# Patient Record
Sex: FEMALE | Race: WHITE | NOT HISPANIC OR LATINO
[De-identification: names, ages, dates, MRNs, and addresses within clinical notes are randomized per-mention and may not be internally consistent; named-entity substitution may affect disease eponyms.]

---

## 2020-11-10 ENCOUNTER — NON-APPOINTMENT (OUTPATIENT)
Age: 60
End: 2020-11-10

## 2020-11-10 DIAGNOSIS — Z82.5 FAMILY HISTORY OF ASTHMA AND OTHER CHRONIC LOWER RESPIRATORY DISEASES: ICD-10-CM

## 2020-11-10 DIAGNOSIS — Z87.09 PERSONAL HISTORY OF OTHER DISEASES OF THE RESPIRATORY SYSTEM: ICD-10-CM

## 2020-11-10 DIAGNOSIS — Z80.1 FAMILY HISTORY OF MALIGNANT NEOPLASM OF TRACHEA, BRONCHUS AND LUNG: ICD-10-CM

## 2020-11-10 DIAGNOSIS — Z87.891 PERSONAL HISTORY OF NICOTINE DEPENDENCE: ICD-10-CM

## 2020-11-10 PROBLEM — Z00.00 ENCOUNTER FOR PREVENTIVE HEALTH EXAMINATION: Status: ACTIVE | Noted: 2020-11-10

## 2020-11-10 RX ORDER — IBUPROFEN 200 MG/1
200 TABLET, COATED ORAL
Refills: 0 | Status: ACTIVE | COMMUNITY

## 2020-11-10 RX ORDER — LORATADINE 10 MG
5-120 TABLET ORAL
Refills: 0 | Status: ACTIVE | COMMUNITY

## 2020-11-10 RX ORDER — ALBUTEROL 90 MCG
90 AEROSOL (GRAM) INHALATION
Refills: 0 | Status: ACTIVE | COMMUNITY

## 2020-11-10 RX ORDER — FLUTICASONE PROPIONATE 50 MCG
50 SPRAY, SUSPENSION NASAL DAILY
Refills: 0 | Status: ACTIVE | COMMUNITY

## 2020-11-19 ENCOUNTER — APPOINTMENT (OUTPATIENT)
Dept: PULMONOLOGY | Facility: CLINIC | Age: 60
End: 2020-11-19
Payer: COMMERCIAL

## 2020-11-19 VITALS
OXYGEN SATURATION: 99 % | DIASTOLIC BLOOD PRESSURE: 80 MMHG | SYSTOLIC BLOOD PRESSURE: 140 MMHG | TEMPERATURE: 97.2 F | HEART RATE: 88 BPM

## 2020-11-19 PROCEDURE — 99214 OFFICE O/P EST MOD 30 MIN: CPT

## 2020-11-19 RX ORDER — BUDESONIDE AND FORMOTEROL FUMARATE DIHYDRATE 80; 4.5 UG/1; UG/1
80-4.5 AEROSOL RESPIRATORY (INHALATION) TWICE DAILY
Qty: 3 | Refills: 3 | Status: ACTIVE | COMMUNITY
Start: 2020-11-19

## 2020-11-19 RX ORDER — MOMETASONE FUROATE AND FORMOTEROL FUMARATE DIHYDRATE 200; 5 UG/1; UG/1
200-5 AEROSOL RESPIRATORY (INHALATION) TWICE DAILY
Refills: 0 | Status: DISCONTINUED | COMMUNITY
End: 2020-11-19

## 2020-12-15 ENCOUNTER — APPOINTMENT (OUTPATIENT)
Dept: PULMONOLOGY | Facility: CLINIC | Age: 60
End: 2020-12-15
Payer: COMMERCIAL

## 2020-12-15 VITALS
OXYGEN SATURATION: 96 % | SYSTOLIC BLOOD PRESSURE: 130 MMHG | DIASTOLIC BLOOD PRESSURE: 90 MMHG | HEART RATE: 99 BPM | TEMPERATURE: 97.3 F

## 2020-12-15 PROCEDURE — 99214 OFFICE O/P EST MOD 30 MIN: CPT

## 2020-12-15 PROCEDURE — 99072 ADDL SUPL MATRL&STAF TM PHE: CPT

## 2020-12-15 RX ORDER — IPRATROPIUM BROMIDE AND ALBUTEROL SULFATE .5; 3 MG/3ML; MG/3ML
SOLUTION RESPIRATORY (INHALATION)
Refills: 0 | Status: ACTIVE | COMMUNITY

## 2020-12-15 RX ORDER — IPRATROPIUM BROMIDE AND ALBUTEROL SULFATE 2.5; .5 MG/3ML; MG/3ML
0.5-2.5 (3) SOLUTION RESPIRATORY (INHALATION)
Qty: 360 | Refills: 3 | Status: ACTIVE | COMMUNITY
Start: 2020-12-15 | End: 1900-01-01

## 2020-12-15 NOTE — PHYSICAL EXAM
[No Acute Distress] : no acute distress [Normal Oropharynx] : normal oropharynx [Normal Appearance] : normal appearance [No Neck Mass] : no neck mass [Normal Rate/Rhythm] : normal rate/rhythm [Normal S1, S2] : normal s1, s2 [No Murmurs] : no murmurs [No Resp Distress] : no resp distress [No Abnormalities] : no abnormalities [Benign] : benign [Normal Gait] : normal gait [No Clubbing] : no clubbing [No Cyanosis] : no cyanosis [No Edema] : no edema [FROM] : FROM [Normal Color/ Pigmentation] : normal color/ pigmentation [No Focal Deficits] : no focal deficits [Oriented x3] : oriented x3 [Normal Affect] : normal affect [TextBox_11] : +pian over  ant sinus region [TextBox_68] : slight coarse rhonchi bilaterally

## 2020-12-15 NOTE — HISTORY OF PRESENT ILLNESS
[Former] : former [Never] : never [TextBox_4] : recent visit and doing well\par one week later lost taste and smell \par no sob no cough\par went to ent and told sinus infection\par has been coughing for 1 week  and wheeze for 4 days \par called ent and has been on abx and no improvement \par pt has these symptoms every few months for past 2 yrs\par no fever sl chest discomfort \par worse with lying down\par no decongestant given [TextBox_11] : 1 [YearQuit] : 2011

## 2020-12-15 NOTE — REASON FOR VISIT
[Follow-Up] : a follow-up visit [Asthma] : asthma [COPD] : COPD [TextBox_44] : PT IS A 59 YO FEMALE WITH HX OF ASTHMA AND COPD.  PT STATES THAT SHE IS CURRENTLY BEING TREATED FOR A SINUS INFECTION AND IS IS CAUSING A WORSENING COUGH AND WHEEZE DUE TO POST NASAL DRIP.  PT ALSO ADMITS TO SOB, BUT DENIES ANY FEVER. PT STATES THAT SHE DOES HAVE DOME PAIN AND TIGHTNESS WITH BREATHING.

## 2020-12-15 NOTE — DISCUSSION/SUMMARY
[FreeTextEntry1] : pt with acute sinusitis and exac asthma\par will dc clindamycin\par start augmentin 875 1 bid #20\par start claritin d 1 qd\par start medrol dose rhiannon\par cont duoneb q4-6 h prn

## 2020-12-29 ENCOUNTER — APPOINTMENT (OUTPATIENT)
Dept: PULMONOLOGY | Facility: CLINIC | Age: 60
End: 2020-12-29
Payer: COMMERCIAL

## 2020-12-29 VITALS
SYSTOLIC BLOOD PRESSURE: 128 MMHG | HEART RATE: 96 BPM | DIASTOLIC BLOOD PRESSURE: 90 MMHG | OXYGEN SATURATION: 98 % | TEMPERATURE: 97.7 F

## 2020-12-29 DIAGNOSIS — J45.909 UNSPECIFIED ASTHMA, UNCOMPLICATED: ICD-10-CM

## 2020-12-29 DIAGNOSIS — J44.9 CHRONIC OBSTRUCTIVE PULMONARY DISEASE, UNSPECIFIED: ICD-10-CM

## 2020-12-29 PROCEDURE — 99072 ADDL SUPL MATRL&STAF TM PHE: CPT

## 2020-12-29 PROCEDURE — 99214 OFFICE O/P EST MOD 30 MIN: CPT

## 2020-12-29 NOTE — HISTORY OF PRESENT ILLNESS
[Former] : former [Never] : never [TextBox_4] : today feels good minimal shortness of breath claudette when going outside claudette cold air\par sinus congestion improved  taste and smell intermittent\par for ent eval next week\par no nite awakening\par no neb use in 10 d ago\par using symbicort bid and singulair daily\par minimal use albuterol [TextBox_11] : 1 [YearQuit] : 2010

## 2020-12-29 NOTE — DISCUSSION/SUMMARY
[FreeTextEntry1] : Pt doing very well\par acute sinus issue improved as did bronchitis\par will cont symbicort 80 bid and singulair 1 qd with albuterol prn\par

## 2020-12-29 NOTE — REASON FOR VISIT
[Follow-Up] : a follow-up visit [COPD] : COPD [TextBox_44] : PT IS A 59 YO FEMALE WITH HX OF COPD. PT STATES THAT HER BREATHING HAS BEEN BETTER - HAS NOT USED NEBULIZER IN WEEK AND A HALF. PT STATES THAT SHE DOES HAVE A LITTLE SOB, BUT DENIES ANY COUGH OR WHEEZE. PT ALSO DENIES ANY FEVER, CHILLS, CHEST PAIN OR TIGHTNESS WHEN BREATHING.

## 2021-05-17 ENCOUNTER — APPOINTMENT (OUTPATIENT)
Dept: PULMONOLOGY | Facility: CLINIC | Age: 61
End: 2021-05-17
Payer: COMMERCIAL

## 2021-05-17 VITALS
OXYGEN SATURATION: 98 % | TEMPERATURE: 96.7 F | SYSTOLIC BLOOD PRESSURE: 126 MMHG | HEART RATE: 77 BPM | DIASTOLIC BLOOD PRESSURE: 76 MMHG

## 2021-05-17 DIAGNOSIS — J20.9 ACUTE BRONCHITIS, UNSPECIFIED: ICD-10-CM

## 2021-05-17 DIAGNOSIS — J45.41 ACUTE BRONCHITIS, UNSPECIFIED: ICD-10-CM

## 2021-05-17 DIAGNOSIS — J01.90 ACUTE SINUSITIS, UNSPECIFIED: ICD-10-CM

## 2021-05-17 PROCEDURE — 99072 ADDL SUPL MATRL&STAF TM PHE: CPT

## 2021-05-17 PROCEDURE — 99214 OFFICE O/P EST MOD 30 MIN: CPT

## 2021-05-17 RX ORDER — DUPILUMAB 300 MG/2ML
300 INJECTION, SOLUTION SUBCUTANEOUS
Qty: 4 | Refills: 0 | Status: ACTIVE | COMMUNITY
Start: 2021-02-18

## 2021-05-17 RX ORDER — FLUOCINONIDE 0.5 MG/ML
0.05 SOLUTION TOPICAL
Qty: 60 | Refills: 0 | Status: ACTIVE | COMMUNITY
Start: 2021-02-04

## 2021-05-17 RX ORDER — PREDNISONE 10 MG/1
10 TABLET ORAL
Qty: 30 | Refills: 0 | Status: ACTIVE | COMMUNITY
Start: 2021-02-16

## 2021-05-17 RX ORDER — METHYLPREDNISOLONE 4 MG/1
4 TABLET ORAL
Qty: 1 | Refills: 1 | Status: DISCONTINUED | COMMUNITY
Start: 2020-12-15 | End: 2021-05-17

## 2021-05-17 RX ORDER — BUDESONIDE AND FORMOTEROL FUMARATE DIHYDRATE 80; 4.5 UG/1; UG/1
80-4.5 AEROSOL RESPIRATORY (INHALATION) TWICE DAILY
Qty: 3 | Refills: 6 | Status: DISCONTINUED | COMMUNITY
Start: 2020-12-15 | End: 2021-05-17

## 2021-05-17 RX ORDER — CLINDAMYCIN HYDROCHLORIDE 300 MG/1
300 CAPSULE ORAL
Refills: 0 | Status: DISCONTINUED | COMMUNITY
End: 2021-05-17

## 2021-05-17 RX ORDER — CLOTRIMAZOLE AND BETAMETHASONE DIPROPIONATE 10; .5 MG/G; MG/G
1-0.05 CREAM TOPICAL
Qty: 15 | Refills: 0 | Status: ACTIVE | COMMUNITY
Start: 2021-05-07

## 2021-05-17 RX ORDER — AMOXICILLIN AND CLAVULANATE POTASSIUM 875; 125 MG/1; MG/1
875-125 TABLET, COATED ORAL
Qty: 20 | Refills: 1 | Status: DISCONTINUED | COMMUNITY
Start: 2020-12-15 | End: 2021-05-17

## 2021-05-17 NOTE — HISTORY OF PRESENT ILLNESS
[Former] : former [Never] : never [TextBox_4] : 60 female wth ch sinusitis and asthma\par Matthew told pt needs surgery  and referred ent surgery  and started on dupixent and seen by allergist and started on injections and feels great\par ++smell and taste\par no sob no cough o wheeze no cp no tightness\par full activity  no awakening  [TextBox_11] : .5 [TextBox_13] : 30 [YearQuit] : 2009

## 2021-05-17 NOTE — DISCUSSION/SUMMARY
[FreeTextEntry1] : overall doing very well\par seems   to be major improvement with aggressive treatment of sinus infection\par will cont all medication\par will try holding symbicort and see if any change in symptoms\par will cont montelukast at this time\par The patient understands and agrees with plan of care.\par Today's office visit encompassed 32 minutes. I conducted an extensive history ,physical exam and reviewed diagnosis and treatment options  including diagnostic tests,radiologic studies including  cat scans  and the use of prescription medication.

## 2021-06-11 ENCOUNTER — RX RENEWAL (OUTPATIENT)
Age: 61
End: 2021-06-11

## 2021-06-11 RX ORDER — ALBUTEROL SULFATE 90 UG/1
108 (90 BASE) AEROSOL, METERED RESPIRATORY (INHALATION)
Qty: 2 | Refills: 11 | Status: ACTIVE | COMMUNITY
Start: 2021-06-11 | End: 1900-01-01

## 2021-06-14 RX ORDER — ALBUTEROL SULFATE 90 UG/1
108 (90 BASE) INHALANT RESPIRATORY (INHALATION)
Qty: 1 | Refills: 0 | Status: ACTIVE | COMMUNITY
Start: 2021-06-14 | End: 1900-01-01

## 2021-07-15 RX ORDER — MONTELUKAST 10 MG/1
10 TABLET, FILM COATED ORAL DAILY
Qty: 90 | Refills: 3 | Status: ACTIVE | COMMUNITY
Start: 1900-01-01 | End: 1900-01-01

## 2025-04-01 ENCOUNTER — APPOINTMENT (OUTPATIENT)
Dept: COLORECTAL SURGERY | Facility: CLINIC | Age: 65
End: 2025-04-01
Payer: COMMERCIAL

## 2025-04-01 VITALS
WEIGHT: 181 LBS | HEIGHT: 60 IN | SYSTOLIC BLOOD PRESSURE: 125 MMHG | BODY MASS INDEX: 35.53 KG/M2 | DIASTOLIC BLOOD PRESSURE: 83 MMHG | TEMPERATURE: 97.3 F | HEART RATE: 85 BPM

## 2025-04-01 DIAGNOSIS — C20 MALIGNANT NEOPLASM OF RECTUM: ICD-10-CM

## 2025-04-01 PROCEDURE — 99205 OFFICE O/P NEW HI 60 MIN: CPT | Mod: 25

## 2025-04-01 PROCEDURE — 45300 PROCTOSIGMOIDOSCOPY DX: CPT

## 2025-04-08 ENCOUNTER — NON-APPOINTMENT (OUTPATIENT)
Age: 65
End: 2025-04-08

## 2025-04-08 ENCOUNTER — OUTPATIENT (OUTPATIENT)
Dept: OUTPATIENT SERVICES | Facility: HOSPITAL | Age: 65
LOS: 1 days | End: 2025-04-08
Payer: COMMERCIAL

## 2025-04-08 ENCOUNTER — RESULT REVIEW (OUTPATIENT)
Age: 65
End: 2025-04-08

## 2025-04-08 DIAGNOSIS — C20 MALIGNANT NEOPLASM OF RECTUM: ICD-10-CM

## 2025-04-08 PROCEDURE — 88321 CONSLTJ&REPRT SLD PREP ELSWR: CPT

## 2025-04-09 DIAGNOSIS — C20 MALIGNANT NEOPLASM OF RECTUM: ICD-10-CM

## 2025-04-11 ENCOUNTER — NON-APPOINTMENT (OUTPATIENT)
Age: 65
End: 2025-04-11

## 2025-04-11 LAB — SURGICAL PATHOLOGY STUDY: SIGNIFICANT CHANGE UP

## 2025-04-14 ENCOUNTER — APPOINTMENT (OUTPATIENT)
Dept: CT IMAGING | Facility: CLINIC | Age: 65
End: 2025-04-14

## 2025-04-14 ENCOUNTER — APPOINTMENT (OUTPATIENT)
Dept: MRI IMAGING | Facility: CLINIC | Age: 65
End: 2025-04-14
Payer: COMMERCIAL

## 2025-04-14 ENCOUNTER — RESULT REVIEW (OUTPATIENT)
Age: 65
End: 2025-04-14

## 2025-04-14 ENCOUNTER — TRANSCRIPTION ENCOUNTER (OUTPATIENT)
Age: 65
End: 2025-04-14

## 2025-04-14 PROCEDURE — 74177 CT ABD & PELVIS W/CONTRAST: CPT

## 2025-04-14 PROCEDURE — 72197 MRI PELVIS W/O & W/DYE: CPT

## 2025-04-14 PROCEDURE — A9585: CPT | Mod: JW

## 2025-04-14 PROCEDURE — 71260 CT THORAX DX C+: CPT

## 2025-04-18 ENCOUNTER — APPOINTMENT (OUTPATIENT)
Dept: COLORECTAL SURGERY | Facility: CLINIC | Age: 65
End: 2025-04-18

## 2025-04-25 ENCOUNTER — NON-APPOINTMENT (OUTPATIENT)
Age: 65
End: 2025-04-25

## 2025-04-25 ENCOUNTER — APPOINTMENT (OUTPATIENT)
Dept: COLORECTAL SURGERY | Facility: CLINIC | Age: 65
End: 2025-04-25
Payer: COMMERCIAL

## 2025-04-25 DIAGNOSIS — C20 MALIGNANT NEOPLASM OF RECTUM: ICD-10-CM

## 2025-04-25 PROCEDURE — 99214 OFFICE O/P EST MOD 30 MIN: CPT | Mod: 93

## 2025-05-06 ENCOUNTER — APPOINTMENT (OUTPATIENT)
Dept: COLORECTAL SURGERY | Facility: CLINIC | Age: 65
End: 2025-05-06
Payer: COMMERCIAL

## 2025-05-06 ENCOUNTER — NON-APPOINTMENT (OUTPATIENT)
Age: 65
End: 2025-05-06

## 2025-05-06 VITALS
DIASTOLIC BLOOD PRESSURE: 84 MMHG | HEART RATE: 78 BPM | SYSTOLIC BLOOD PRESSURE: 127 MMHG | TEMPERATURE: 78 F | WEIGHT: 181 LBS | BODY MASS INDEX: 35.53 KG/M2 | HEIGHT: 60 IN

## 2025-05-06 DIAGNOSIS — C20 MALIGNANT NEOPLASM OF RECTUM: ICD-10-CM

## 2025-05-06 DIAGNOSIS — J45.909 UNSPECIFIED ASTHMA, UNCOMPLICATED: ICD-10-CM

## 2025-05-06 DIAGNOSIS — J44.9 CHRONIC OBSTRUCTIVE PULMONARY DISEASE, UNSPECIFIED: ICD-10-CM

## 2025-05-06 PROCEDURE — 99215 OFFICE O/P EST HI 40 MIN: CPT

## 2025-05-20 ENCOUNTER — OUTPATIENT (OUTPATIENT)
Dept: OUTPATIENT SERVICES | Facility: HOSPITAL | Age: 65
LOS: 1 days | End: 2025-05-20
Payer: COMMERCIAL

## 2025-05-20 VITALS
RESPIRATION RATE: 16 BRPM | WEIGHT: 182.1 LBS | TEMPERATURE: 98 F | OXYGEN SATURATION: 99 % | DIASTOLIC BLOOD PRESSURE: 64 MMHG | HEIGHT: 60 IN | HEART RATE: 83 BPM | SYSTOLIC BLOOD PRESSURE: 126 MMHG

## 2025-05-20 DIAGNOSIS — C20 MALIGNANT NEOPLASM OF RECTUM: ICD-10-CM

## 2025-05-20 DIAGNOSIS — Z98.891 HISTORY OF UTERINE SCAR FROM PREVIOUS SURGERY: Chronic | ICD-10-CM

## 2025-05-20 DIAGNOSIS — Z98.890 OTHER SPECIFIED POSTPROCEDURAL STATES: Chronic | ICD-10-CM

## 2025-05-20 DIAGNOSIS — Z90.49 ACQUIRED ABSENCE OF OTHER SPECIFIED PARTS OF DIGESTIVE TRACT: Chronic | ICD-10-CM

## 2025-05-20 DIAGNOSIS — Z01.818 ENCOUNTER FOR OTHER PREPROCEDURAL EXAMINATION: ICD-10-CM

## 2025-05-20 LAB
ALBUMIN SERPL ELPH-MCNC: 3.3 G/DL — SIGNIFICANT CHANGE UP (ref 3.3–5)
ALP SERPL-CCNC: 78 U/L — SIGNIFICANT CHANGE UP (ref 40–120)
ALT FLD-CCNC: 25 U/L — SIGNIFICANT CHANGE UP (ref 12–78)
ANION GAP SERPL CALC-SCNC: 3 MMOL/L — LOW (ref 5–17)
APTT BLD: 27.5 SEC — SIGNIFICANT CHANGE UP (ref 26.1–36.8)
AST SERPL-CCNC: 18 U/L — SIGNIFICANT CHANGE UP (ref 15–37)
BASOPHILS # BLD AUTO: 0.08 K/UL — SIGNIFICANT CHANGE UP (ref 0–0.2)
BASOPHILS NFR BLD AUTO: 0.7 % — SIGNIFICANT CHANGE UP (ref 0–2)
BILIRUB DIRECT SERPL-MCNC: <0.1 MG/DL — SIGNIFICANT CHANGE UP (ref 0–0.3)
BILIRUB INDIRECT FLD-MCNC: >0.3 MG/DL — SIGNIFICANT CHANGE UP (ref 0.2–1)
BILIRUB SERPL-MCNC: 0.4 MG/DL — SIGNIFICANT CHANGE UP (ref 0.2–1.2)
BILIRUB SERPL-MCNC: 0.4 MG/DL — SIGNIFICANT CHANGE UP (ref 0.2–1.2)
BUN SERPL-MCNC: 18 MG/DL — SIGNIFICANT CHANGE UP (ref 7–23)
CALCIUM SERPL-MCNC: 9.3 MG/DL — SIGNIFICANT CHANGE UP (ref 8.5–10.1)
CEA SERPL-MCNC: 3 NG/ML — SIGNIFICANT CHANGE UP (ref 0–3.8)
CHLORIDE SERPL-SCNC: 110 MMOL/L — HIGH (ref 96–108)
CO2 SERPL-SCNC: 26 MMOL/L — SIGNIFICANT CHANGE UP (ref 22–31)
CREAT SERPL-MCNC: 0.62 MG/DL — SIGNIFICANT CHANGE UP (ref 0.5–1.3)
EGFR: 99 ML/MIN/1.73M2 — SIGNIFICANT CHANGE UP
EGFR: 99 ML/MIN/1.73M2 — SIGNIFICANT CHANGE UP
EOSINOPHIL # BLD AUTO: 0.43 K/UL — SIGNIFICANT CHANGE UP (ref 0–0.5)
EOSINOPHIL NFR BLD AUTO: 3.8 % — SIGNIFICANT CHANGE UP (ref 0–6)
GLUCOSE SERPL-MCNC: 115 MG/DL — HIGH (ref 70–99)
HCT VFR BLD CALC: 36 % — SIGNIFICANT CHANGE UP (ref 34.5–45)
HGB BLD-MCNC: 12 G/DL — SIGNIFICANT CHANGE UP (ref 11.5–15.5)
IMM GRANULOCYTES # BLD AUTO: 0.04 K/UL — SIGNIFICANT CHANGE UP (ref 0–0.07)
IMM GRANULOCYTES NFR BLD AUTO: 0.4 % — SIGNIFICANT CHANGE UP (ref 0–0.9)
INR BLD: 0.93 RATIO — SIGNIFICANT CHANGE UP (ref 0.85–1.16)
LYMPHOCYTES # BLD AUTO: 2.58 K/UL — SIGNIFICANT CHANGE UP (ref 1–3.3)
LYMPHOCYTES NFR BLD AUTO: 22.6 % — SIGNIFICANT CHANGE UP (ref 13–44)
MCHC RBC-ENTMCNC: 29.7 PG — SIGNIFICANT CHANGE UP (ref 27–34)
MCHC RBC-ENTMCNC: 33.3 G/DL — SIGNIFICANT CHANGE UP (ref 32–36)
MCV RBC AUTO: 89.1 FL — SIGNIFICANT CHANGE UP (ref 80–100)
MONOCYTES # BLD AUTO: 1.14 K/UL — HIGH (ref 0–0.9)
MONOCYTES NFR BLD AUTO: 10 % — SIGNIFICANT CHANGE UP (ref 2–14)
NEUTROPHILS # BLD AUTO: 7.15 K/UL — SIGNIFICANT CHANGE UP (ref 1.8–7.4)
NEUTROPHILS NFR BLD AUTO: 62.5 % — SIGNIFICANT CHANGE UP (ref 43–77)
NRBC # BLD AUTO: 0 K/UL — SIGNIFICANT CHANGE UP (ref 0–0)
NRBC # FLD: 0 K/UL — SIGNIFICANT CHANGE UP (ref 0–0)
NRBC BLD AUTO-RTO: 0 /100 WBCS — SIGNIFICANT CHANGE UP (ref 0–0)
PLATELET # BLD AUTO: 365 K/UL — SIGNIFICANT CHANGE UP (ref 150–400)
PMV BLD: 8.8 FL — SIGNIFICANT CHANGE UP (ref 7–13)
POTASSIUM SERPL-MCNC: 3.9 MMOL/L — SIGNIFICANT CHANGE UP (ref 3.5–5.3)
POTASSIUM SERPL-SCNC: 3.9 MMOL/L — SIGNIFICANT CHANGE UP (ref 3.5–5.3)
PROT SERPL-MCNC: 7.1 GM/DL — SIGNIFICANT CHANGE UP (ref 6–8.3)
PROTHROM AB SERPL-ACNC: 11 SEC — SIGNIFICANT CHANGE UP (ref 9.9–13.4)
RBC # BLD: 4.04 M/UL — SIGNIFICANT CHANGE UP (ref 3.8–5.2)
RBC # FLD: 13.6 % — SIGNIFICANT CHANGE UP (ref 10.3–14.5)
SODIUM SERPL-SCNC: 139 MMOL/L — SIGNIFICANT CHANGE UP (ref 135–145)
WBC # BLD: 11.42 K/UL — HIGH (ref 3.8–10.5)
WBC # FLD AUTO: 11.42 K/UL — HIGH (ref 3.8–10.5)

## 2025-05-20 PROCEDURE — 86901 BLOOD TYPING SEROLOGIC RH(D): CPT

## 2025-05-20 PROCEDURE — 86900 BLOOD TYPING SEROLOGIC ABO: CPT

## 2025-05-20 PROCEDURE — 82248 BILIRUBIN DIRECT: CPT

## 2025-05-20 PROCEDURE — 82247 BILIRUBIN TOTAL: CPT

## 2025-05-20 PROCEDURE — 80053 COMPREHEN METABOLIC PANEL: CPT

## 2025-05-20 PROCEDURE — 82378 CARCINOEMBRYONIC ANTIGEN: CPT

## 2025-05-20 PROCEDURE — 93005 ELECTROCARDIOGRAM TRACING: CPT

## 2025-05-20 PROCEDURE — 93010 ELECTROCARDIOGRAM REPORT: CPT

## 2025-05-20 PROCEDURE — 83036 HEMOGLOBIN GLYCOSYLATED A1C: CPT

## 2025-05-20 PROCEDURE — 85610 PROTHROMBIN TIME: CPT

## 2025-05-20 PROCEDURE — 36415 COLL VENOUS BLD VENIPUNCTURE: CPT

## 2025-05-20 PROCEDURE — 99214 OFFICE O/P EST MOD 30 MIN: CPT | Mod: 25

## 2025-05-20 PROCEDURE — 86850 RBC ANTIBODY SCREEN: CPT

## 2025-05-20 PROCEDURE — 85730 THROMBOPLASTIN TIME PARTIAL: CPT

## 2025-05-20 PROCEDURE — 85025 COMPLETE CBC W/AUTO DIFF WBC: CPT

## 2025-05-20 NOTE — H&P PST ADULT - RESPIRATORY AND THORAX COMMENTS
hx of asthma never intubated or hospitalized , last use of inhaler 2 years ago, on Dupixent last taken 5/18/25 patient state "WBC  typically drops after  receiving it  "

## 2025-05-20 NOTE — H&P PST ADULT - NSICDXPASTMEDICALHX_GEN_ALL_CORE_FT
PAST MEDICAL HISTORY:  Asthma     History of diverticulitis     Rectal cancer     Seasonal allergies

## 2025-05-20 NOTE — H&P PST ADULT - HISTORY OF PRESENT ILLNESS
65 y/o female presents to [facility] for scheduled laparoscopic possible open Ultra low anterior resection proctectomy with lymph node dissection proctosigmoidoscopy diverting loop ileostomy on 6/5/25. Patient reports initial colonoscopy done 3/2025 and dx with rectal cancer s/p  1 round of chemo completed 4/2025.

## 2025-05-20 NOTE — H&P PST ADULT - PROBLEM SELECTOR PLAN 1
Pre op and chlorhexidine instructions given and explained.  colon sheet provided and bowel prep advised as per surgeon   Avoid NSAIDs and OTC supplements.   Patient verbalized understanding  medical optimization requested by surgeon   cbc, bmp, type and screen, ptt/inr, a1c,  albumin, cea, lft  done today in PST

## 2025-05-20 NOTE — H&P PST ADULT - ASSESSMENT
65 y/o female presents to [facility] for scheduled laparoscopic possible open Ultra low anterior resection proctectomy with lymph node dissection proctosigmoidoscopy diverting loop ileostomy on 25    CAPRINI SCORE Version 2013    AGE RELATED RISK FACTORS                                                             [ ] Age 41-60 years             [1 point]  [ x] Age: 61-74 years            [2 points]                 [ ] Age 75 years or over     [3 points]             DISEASE RELATED RISK FACTORS                                                       [ ] Current swollen legs (pitting edema of any level)     [1 point]                     [ ] Varicose veins (visible, bulging vein, not spider veins or surgically removed veins)   [1 point]                                 [x ] BMI > 25 Kg/m2                       [1 point]  [ ] BMI > 40 kg/m2                       [1 point]                                 [ ] Serious infection (within past month, requiring hospitalization and IV antibiotics)    [1 point]                     [ ] Lung disease ( interstitial: COPD, emphysema, sarcoid, 9-11 illness, NOT asthma)       [1 point]                                                                          [ ] Acute myocardial infarction (within past month)      [1 point]                  [ ] Congestive heart failure (episode within past month or taking CHF meds)                   [1 point]         [ ] Inflammatory bowel disease (Crohns disease or ulcerative colitis, not irritable bowel syndrome)   [1 point]                  [ ] Central venous access, PICC line or Port (within past month)     [2 points]                                                             [ ] Stroke (within past month)     [5 points]    [ x] Previous or present malignancy (includes melanoma but not basal cell carcinoma, each incidence of cancer scores 2 points-not metastases)  [2 points]                                                                                                                                                         HEMATOLOGY RELATED FACTORS                                                         [ ] History of DVT or PE (including superficial venous thrombosis)    [3 points]                    [ ] Positive family history for DVT or PE (includes first-, second-, and third-degree relatives)   [3 points]   [ ] Personal or family history of genetic thrombophilia (family history counts only if it has not been confirmed that patient does not have this genetic marker)                       [ ] Prothrombin 66719H mutation                   [3 points]                           [ ] Factor V Leiden                                               [3 points]            [ ] Antithrombin III deficiency                           [3 points]         [ ] Protein C & S deficiency                                [3 points]              [ ] Dysfibrinogenemia                                         [3 points]  [ ] Personal history of acquired thrombophilia                       [ ] Lupus anticoagulant                                       [3 points]                                                                  [ ] Anticardiolipin antibodies                             [3 points]              [ ] Antiphospholipid antibodies                         [3 points]                                                         [ ] High homocysteine in the blood                  [3 points]                                                    [ ] Myeloproliferative disorders (including thrombocytosis)    [3 points]            [ ] HIV                                                                     [3 points]                                                    [ ] Heparin induced thrombocytopenia            [3 points]                                        MOBILITY RELATED FACTORS  [ ] Bed rest or restricted mobility (inability to ambulate 30 feet continuously or removable leg brace) for less than 72 hours    [1 point]  [ ] Nonremovable plaster cast or mold that prevents calf muscle use   [2 points]  [ ] Bed bound  or restricted mobility for more than 72 hours                  [2 points]    GENDER SPECIFIC FACTORS  [ ] Pregnancy or had a baby within the last month   [1 point]  [ ] Hormone therapy  or oral contraception               [1 point]  [ ] Current use of estrogen-like drugs (raloxifine, tamoxifen, anastrozole, letrozole)                                [1 point]  [ ] History of unexplained stillborn infant, premature birth with toxemia or growth-restricted infant   [1 point]  [ ] Recurrent spontaneous abortions (3 or more)      [1 point]    OTHER RISK FACTORS                                         [ ] Current smoker (includes vaping and smoking marijuana)      [1 point]  [ ] Diabetes requiring insulin     [1 point]                   [ ] Chemotherapy (includes methotrexate for rheumatoid arthritis, hydroxyurea for thrombocytosis)    [1 point]  [ ] Blood transfusion(s)               [1 point]    SURGERY RELATED RISK FACTORS  [ ]  section within the last month                    [1 point]  [ ] Minor surgery is planned (less than 45 minutes)     [1 point]  [ ] Past major surgery (longer than 45 minutes) within past month  [2 points]  [x ] Planned major surgery lasting more than 45 minutes (includes laparoscopic and arthroscopic; do not add to the "5" for hip and knee replacement)    [2 points]  [x ] Length of surgery over 2 hours (includes anesthesia time; do not add to the "5" for hip and knee replacement)   [1 point]  [ ] Elective hip or knee joint replacement surgery         [5 points]                                               TRAUMA RELATED RISK FACTORS  [ ] Fracture of the hip, pelvis, or leg                       [5 points]  [ ] Spinal cord injury resulting in paralysis (within the past month)    [5 points]  [ ] Paralysis  (within the past month)                      [5 points]  [ ] Multiple trauma (within the past month)         [5 Points]    Total Score [ 8       ]      Caprini score 7 or higher: High risk, pharmocologic VTE prophylaxis indicated for most patients (in the absence of contraindications)

## 2025-05-20 NOTE — H&P PST ADULT - NSICDXFAMILYHX_GEN_ALL_CORE_FT
FAMILY HISTORY:  Mother  Still living? Unknown  FH: esophageal cancer, Age at diagnosis: Age Unknown    Sibling  Still living? Unknown  FHx: breast cancer, Age at diagnosis: Age Unknown

## 2025-05-20 NOTE — H&P PST ADULT - NSICDXPASTSURGICALHX_GEN_ALL_CORE_FT
PAST SURGICAL HISTORY:  H/O colonoscopy     H/O shoulder surgery     H/O sinus surgery     History of infusaport central venous catheter insertion     S/P      S/P cholecystectomy

## 2025-05-21 DIAGNOSIS — Z01.818 ENCOUNTER FOR OTHER PREPROCEDURAL EXAMINATION: ICD-10-CM

## 2025-05-21 LAB
A1C WITH ESTIMATED AVERAGE GLUCOSE RESULT: 5.6 % — SIGNIFICANT CHANGE UP (ref 4–5.6)
ESTIMATED AVERAGE GLUCOSE: 114 MG/DL — SIGNIFICANT CHANGE UP (ref 68–114)

## 2025-05-21 RX ORDER — NEOMYCIN SULFATE 500 MG/1
500 TABLET ORAL
Qty: 6 | Refills: 0 | Status: ACTIVE | COMMUNITY
Start: 2025-05-21 | End: 1900-01-01

## 2025-05-21 RX ORDER — METRONIDAZOLE 500 MG/1
500 TABLET ORAL
Qty: 6 | Refills: 0 | Status: ACTIVE | COMMUNITY
Start: 2025-05-21 | End: 1900-01-01

## 2025-06-05 ENCOUNTER — APPOINTMENT (OUTPATIENT)
Dept: COLORECTAL SURGERY | Facility: HOSPITAL | Age: 65
End: 2025-06-05

## 2025-06-05 ENCOUNTER — RESULT REVIEW (OUTPATIENT)
Age: 65
End: 2025-06-05

## 2025-06-05 ENCOUNTER — INPATIENT (INPATIENT)
Facility: HOSPITAL | Age: 65
LOS: 3 days | Discharge: HOME HEALTH | DRG: 376 | End: 2025-06-09
Attending: COLON & RECTAL SURGERY | Admitting: COLON & RECTAL SURGERY
Payer: COMMERCIAL

## 2025-06-05 VITALS — WEIGHT: 182.1 LBS

## 2025-06-05 DIAGNOSIS — C20 MALIGNANT NEOPLASM OF RECTUM: ICD-10-CM

## 2025-06-05 DIAGNOSIS — Z90.49 ACQUIRED ABSENCE OF OTHER SPECIFIED PARTS OF DIGESTIVE TRACT: Chronic | ICD-10-CM

## 2025-06-05 DIAGNOSIS — Z98.890 OTHER SPECIFIED POSTPROCEDURAL STATES: Chronic | ICD-10-CM

## 2025-06-05 DIAGNOSIS — Z98.891 HISTORY OF UTERINE SCAR FROM PREVIOUS SURGERY: Chronic | ICD-10-CM

## 2025-06-05 LAB
ABO RH CONFIRMATION: SIGNIFICANT CHANGE UP
GLUCOSE BLDC GLUCOMTR-MCNC: 118 MG/DL — HIGH (ref 70–99)
GLUCOSE BLDC GLUCOMTR-MCNC: 159 MG/DL — HIGH (ref 70–99)

## 2025-06-05 PROCEDURE — C1889: CPT

## 2025-06-05 PROCEDURE — 88309 TISSUE EXAM BY PATHOLOGIST: CPT | Mod: 26

## 2025-06-05 PROCEDURE — C1765: CPT

## 2025-06-05 PROCEDURE — 88305 TISSUE EXAM BY PATHOLOGIST: CPT | Mod: 26

## 2025-06-05 PROCEDURE — 80048 BASIC METABOLIC PNL TOTAL CA: CPT

## 2025-06-05 PROCEDURE — 88309 TISSUE EXAM BY PATHOLOGIST: CPT

## 2025-06-05 PROCEDURE — 38570 LAPAROSCOPY LYMPH NODE BIOP: CPT

## 2025-06-05 PROCEDURE — 88300 SURGICAL PATH GROSS: CPT

## 2025-06-05 PROCEDURE — 45300 PROCTOSIGMOIDOSCOPY DX: CPT

## 2025-06-05 PROCEDURE — 45397 LAP REMOVE RECTUM W/POUCH: CPT

## 2025-06-05 PROCEDURE — 36415 COLL VENOUS BLD VENIPUNCTURE: CPT

## 2025-06-05 PROCEDURE — 82962 GLUCOSE BLOOD TEST: CPT

## 2025-06-05 PROCEDURE — 88300 SURGICAL PATH GROSS: CPT | Mod: 26,59

## 2025-06-05 PROCEDURE — 85027 COMPLETE CBC AUTOMATED: CPT

## 2025-06-05 PROCEDURE — 93005 ELECTROCARDIOGRAM TRACING: CPT

## 2025-06-05 PROCEDURE — 99222 1ST HOSP IP/OBS MODERATE 55: CPT

## 2025-06-05 PROCEDURE — 83735 ASSAY OF MAGNESIUM: CPT

## 2025-06-05 PROCEDURE — 88305 TISSUE EXAM BY PATHOLOGIST: CPT

## 2025-06-05 PROCEDURE — 38570 LAPAROSCOPY LYMPH NODE BIOP: CPT | Mod: AS

## 2025-06-05 PROCEDURE — 45397 LAP REMOVE RECTUM W/POUCH: CPT | Mod: AS

## 2025-06-05 PROCEDURE — C9399: CPT

## 2025-06-05 PROCEDURE — 84100 ASSAY OF PHOSPHORUS: CPT

## 2025-06-05 RX ORDER — CEFOTETAN DISODIUM 1 G
2 VIAL (EA) INJECTION ONCE
Refills: 0 | Status: COMPLETED | OUTPATIENT
Start: 2025-06-05 | End: 2025-06-05

## 2025-06-05 RX ORDER — HEPARIN SODIUM 1000 [USP'U]/ML
5000 INJECTION INTRAVENOUS; SUBCUTANEOUS ONCE
Refills: 0 | Status: COMPLETED | OUTPATIENT
Start: 2025-06-05 | End: 2025-06-05

## 2025-06-05 RX ORDER — OXYCODONE HYDROCHLORIDE 30 MG/1
5 TABLET ORAL EVERY 4 HOURS
Refills: 0 | Status: DISCONTINUED | OUTPATIENT
Start: 2025-06-05 | End: 2025-06-09

## 2025-06-05 RX ORDER — BUPIVACAINE 13.3 MG/ML
20 INJECTION, SUSPENSION, LIPOSOMAL INFILTRATION ONCE
Refills: 0 | Status: DISCONTINUED | OUTPATIENT
Start: 2025-06-05 | End: 2025-06-09

## 2025-06-05 RX ORDER — ALVIMOPAN 12 MG/1
12 CAPSULE ORAL ONCE
Refills: 0 | Status: COMPLETED | OUTPATIENT
Start: 2025-06-05 | End: 2025-06-05

## 2025-06-05 RX ORDER — PROCHLORPERAZINE 25 MG
10 SUPPOSITORY, RECTAL RECTAL EVERY 6 HOURS
Refills: 0 | Status: DISCONTINUED | OUTPATIENT
Start: 2025-06-05 | End: 2025-06-09

## 2025-06-05 RX ORDER — ENOXAPARIN SODIUM 100 MG/ML
40 INJECTION SUBCUTANEOUS EVERY 24 HOURS
Refills: 0 | Status: DISCONTINUED | OUTPATIENT
Start: 2025-06-06 | End: 2025-06-09

## 2025-06-05 RX ORDER — ONDANSETRON HCL/PF 4 MG/2 ML
4 VIAL (ML) INJECTION ONCE
Refills: 0 | Status: COMPLETED | OUTPATIENT
Start: 2025-06-05 | End: 2025-06-05

## 2025-06-05 RX ORDER — ALVIMOPAN 12 MG/1
12 CAPSULE ORAL EVERY 12 HOURS
Refills: 0 | Status: DISCONTINUED | OUTPATIENT
Start: 2025-06-06 | End: 2025-06-06

## 2025-06-05 RX ORDER — KETOROLAC TROMETHAMINE 30 MG/ML
15 INJECTION, SOLUTION INTRAMUSCULAR; INTRAVENOUS EVERY 6 HOURS
Refills: 0 | Status: DISCONTINUED | OUTPATIENT
Start: 2025-06-05 | End: 2025-06-06

## 2025-06-05 RX ORDER — ONDANSETRON HCL/PF 4 MG/2 ML
4 VIAL (ML) INJECTION EVERY 6 HOURS
Refills: 0 | Status: DISCONTINUED | OUTPATIENT
Start: 2025-06-05 | End: 2025-06-09

## 2025-06-05 RX ORDER — OXYCODONE HYDROCHLORIDE 30 MG/1
10 TABLET ORAL EVERY 4 HOURS
Refills: 0 | Status: DISCONTINUED | OUTPATIENT
Start: 2025-06-05 | End: 2025-06-09

## 2025-06-05 RX ORDER — DUPILUMAB 200 MG/1.14ML
300 INJECTION, SOLUTION SUBCUTANEOUS
Refills: 0 | DISCHARGE

## 2025-06-05 RX ORDER — POTASSIUM CHLORIDE, DEXTROSE MONOHYDRATE AND SODIUM CHLORIDE 150; 5; 900 MG/100ML; G/100ML; MG/100ML
1000 INJECTION, SOLUTION INTRAVENOUS
Refills: 0 | Status: DISCONTINUED | OUTPATIENT
Start: 2025-06-05 | End: 2025-06-07

## 2025-06-05 RX ORDER — HYDROMORPHONE/SOD CHLOR,ISO/PF 2 MG/10 ML
0.5 SYRINGE (ML) INJECTION
Refills: 0 | Status: DISCONTINUED | OUTPATIENT
Start: 2025-06-05 | End: 2025-06-05

## 2025-06-05 RX ORDER — ACETAMINOPHEN 500 MG/5ML
1000 LIQUID (ML) ORAL EVERY 6 HOURS
Refills: 0 | Status: COMPLETED | OUTPATIENT
Start: 2025-06-05 | End: 2025-06-06

## 2025-06-05 RX ORDER — SODIUM CHLORIDE 9 G/1000ML
1000 INJECTION, SOLUTION INTRAVENOUS
Refills: 0 | Status: DISCONTINUED | OUTPATIENT
Start: 2025-06-05 | End: 2025-06-05

## 2025-06-05 RX ADMIN — Medication 10 MILLIGRAM(S): at 15:54

## 2025-06-05 RX ADMIN — Medication 400 MILLIGRAM(S): at 17:57

## 2025-06-05 RX ADMIN — Medication 3 MILLILITER(S): at 23:32

## 2025-06-05 RX ADMIN — ALVIMOPAN 12 MILLIGRAM(S): 12 CAPSULE ORAL at 08:45

## 2025-06-05 RX ADMIN — Medication 4 MILLIGRAM(S): at 13:55

## 2025-06-05 RX ADMIN — POTASSIUM CHLORIDE, DEXTROSE MONOHYDRATE AND SODIUM CHLORIDE 125 MILLILITER(S): 150; 5; 900 INJECTION, SOLUTION INTRAVENOUS at 17:59

## 2025-06-05 RX ADMIN — HEPARIN SODIUM 5000 UNIT(S): 1000 INJECTION INTRAVENOUS; SUBCUTANEOUS at 08:45

## 2025-06-05 NOTE — CONSULT NOTE ADULT - NS ATTEND AMEND GEN_ALL_CORE FT
Patient seen and examined with HELADIO Roberts.  I was physically present for the key portions of the evaluation and management (E/M) service provided.  I agree with the above history, physical, and plan which I have reviewed and edited where appropriate.     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 She did have 1 round of chemo u=in April so far.  She was referred to Dr. Wiley and pt is now seen in PACU, S/P LAR with creation of Ileostomy.  We are consulted for medical management    resp cta b/l  abd soft +dressing in place       pain control  IVF's  whittaker  Monitor I& O  Monitor Cbc, BMP  BGM per CRS protocol  Cont Abxs  diet by CRS  Enterag      # asthma, mod persistent, controlled  no sob or wheezing noted currently  cont albuterol inhaler, singulair, symbicort  gets allergy shots every 2 weeks, Dupixent injections    #VTE prophylaxis  lovenox  Venodynes  Amb

## 2025-06-05 NOTE — BRIEF OPERATIVE NOTE - COMMENTS
I, MASHA Song, was present with Dr. Wiley for the entirety of the procedure, assisting during all key portions of the procedure. For further details, please refer to his operative dictation.

## 2025-06-05 NOTE — BRIEF OPERATIVE NOTE - NSICDXBRIEFPROCEDURE_GEN_ALL_CORE_FT
PROCEDURES:  Laparoscopic assisted low anterior resection 05-Jun-2025 12:06:03  Kedar Song  Mobilization of splenic flexure 05-Jun-2025 12:06:12  Kedar Song  Rigid proctosigmoidoscpy 05-Jun-2025 12:06:20  Kedar Song  Creation, ileostomy, loop, open 05-Jun-2025 12:06:26  Kedar Song

## 2025-06-05 NOTE — OPERATIVE REPORT - OPERATION
TOTAL MESORECTAL EXCISION (TME) W/ COLO-RECTAL or COLO-ANAL ANASTOMOSIS WITH MESENTERIC LYMPH NOTE REMOVAL

## 2025-06-05 NOTE — BRIEF OPERATIVE NOTE - CONDITION POST OP
Bed: B02B  Expected date: 10/26/17  Expected time:   Means of arrival: [facility] (10)  Comments:  79 year old female with chest pain A&Ox4  124/59, 98, 16, BS-115 x 1 nitro given    stable

## 2025-06-05 NOTE — CONSULT NOTE ADULT - SUBJECTIVE AND OBJECTIVE BOX
PCP: Dr. Behrouz Farahmandpour    CHIEF COMPLAINT: colon ca    HISTORY OF THE PRESENT ILLNESS: This is a 63 yo female with pmh: asthma, mod persistance COPD    PAST MEDICAL & SURGICAL HISTORY:  Rectal cancer      Asthma      Seasonal allergies      History of diverticulitis      S/P       S/P cholecystectomy      H/O sinus surgery      H/O shoulder surgery      H/O colonoscopy      History of infusaport central venous catheter insertion          FAMILY HISTORY:  FH: esophageal cancer (Mother)    FHx: breast cancer (Sibling)        Social History:      Allergies    No Known Allergies    Intolerances        Home Medications:  allergy shot every 3 weeks :  (2025 08:19)  Dupixent Pre-filled Pen 300 mg/2 mL subcutaneous solution: 300 milligram(s) subcutaneously 2 times a month last use  25 (2025 08:19)      Vital Signs Last 24 Hrs  T(C): 36.7 (2025 08:25), Max: 36.7 (2025 08:25)  T(F): 98 (2025 08:25), Max: 98 (2025 08:25)  HR: 92 (2025 08:25) (92 - 92)  BP: 141/67 (2025 08:25) (141/67 - 141/67)  BP(mean): --  RR: 16 (2025 08:25) (16 - 16)  SpO2: 99% (2025 08:25) (99% - 99%)          REVIEW OF SYSTEMS:   All 10 systems reviewed in detailed and found to be negative with the exception of what has already been described above    MEDICATIONS  (STANDING):  BUPivacaine liposome 1.3% Injectable 20 milliLiter(s) Local Injection once    MEDICATIONS  (PRN):      HEENT:  pupils equal and reactive, EOMI, no oropharyngeal lesions, erythema, exudates, oral thrush  NECK:   supple, no carotid bruits  CV:  +S1, +S2, regular, no murmurs  RESP:   lungs clear to auscultation bilaterally, no wheezing, rales, rhonchi, good air entry bilaterally  GI:  abdomen soft, non-tender, non-distended, normal BS  EXT:  no clubbing, no cyanosis, no edema, no calf pain, swelling or erythema  NEURO:  AAOX3, no focal neurological deficits, follows all commands, able to move extremities spontaneously  SKIN:  no ulcers, lesions or rashes    LABS:                              Troponin Trend:                         EKG:     RADIOLOGY STUDIES:      IMPRESSION: with above pmh. a/w:    pain control  IVF's  whittaker  Monitor I& O  Monitor Cbc, BMP  BGM per CRS protocol  Cont Abxs  NPO  Enterag    #VTE prophylaxis  hep sq  Venodynes  Amb               PCP: Dr. 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  She was referred to Dr. Wiley and pt is now seen in PACU, S/P LAR with creation of Ileostomy.  Pt is seen in PACU, still sedated, VSS in NAD.  We are consulted for medical management     PMH:  as above  PSH:  S/P , S/P cholecystectomy, H/O sinus surgery, H/O shoulder surgery, H/O colonoscopy, History of infusaport central venous catheter insertion    FAMILY HISTORY:  FH: esophageal cancer (Mother)  FHx: breast cancer (Sibling)    Social History: former smoker, 1/2 ppd x 30 years, rare alcohol, recreational cannibis from dispensary    Allergies: No Known Allergies      Home Medications:  allergy shot every 3 weeks :  (2025 08:19)  Dupixent Pre-filled Pen 300 mg/2 mL subcutaneous solution: 300 milligram(s) subcutaneously 2 times a month last use  25 (2025 08:19)      Vital Signs Last 24 Hrs  T(C): 36.7 (2025 08:25), Max: 36.7 (2025 08:25)  T(F): 98 (2025 08:25), Max: 98 (2025 08:25)  HR: 92 (2025 08:25) (92 - 92)  BP: 141/67 (2025 08:25) (141/67 - 141/67)  BP(mean): --  RR: 16 (2025 08:25) (16 - 16)  SpO2: 99% (2025 08:25) (99% - 99%)      REVIEW OF SYSTEMS:   unable to assess 2/2 sedation    MEDICATIONS  (STANDING):  BUPivacaine liposome 1.3% Injectable 20 milliLiter(s) Local Injection once    MEDICATIONS  (PRN):    PHYSICAL EXAM:    GENERAL: Comfortable, no acute distress   HEAD:  Normocephalic, atraumatic  EYES: EOMI, PERRLA  HEENT: Moist mucous membranes  NECK: Supple, No JVD  NERVOUS SYSTEM:  still sedated  CHEST/LUNG: Clear to auscultation bilaterally  HEART: Regular rate and rhythm  ABDOMEN: Soft, obese, Bowel sounds absent, RLQ ileostomy with viable stoma, lap sites c/d/i with dermabond, mid line NP seal  GENITOURINARY: Voiding, no palpable bladder  EXTREMITIES:   No clubbing, cyanosis, or edema  MUSCULOSKELETAL- No muscle tenderness, no joint tenderness  SKIN-no rash      LABS: preop labs reviewed      IMPRESSION:  This is a 65 yo female with pmh: diverticulitis, asthma, mod persistent, who had a colonoscopy in march and found to have mass, 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 She did have 1 round of chemo u=in April so far.  She was referred to Dr. Wiley and pt is now seen in PACU, S/P LAR with creation of Ileostomy.  We are consulted for medical management      pain control  IVF's  whittaker  Monitor I& O  Monitor Cbc, BMP  BGM per CRS protocol  Cont Abxs  diet by CRS  Enterag      # asthma, mod persistent, controlled  no sob or wheezing noted currently  cont albuterol inhaler, singulair, symbicort  gets allergy shots every 2 weeks, Dupixent injections    #VTE prophylaxis  lovenox  Venodynes  Amb               PCP: Dr. 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  She was referred to Dr. Wiley and pt is now seen in PACU, S/P LAR with creation of Ileostomy.  Pt is seen in PACU, still sedated, VSS in NAD.  We are consulted for medical management     PMH:  as above  PSH:  S/P , S/P cholecystectomy, H/O sinus surgery, H/O shoulder surgery, H/O colonoscopy, History of infusaport central venous catheter insertion    FAMILY HISTORY:  FH: esophageal cancer (Mother)  FHx: breast cancer (Sibling)    Social History: former smoker, 1/2 ppd x 30 years, rare alcohol, recreational cannibis from dispensary    Allergies: No Known Allergies      Home Medications:  allergy shot every 3 weeks :  (2025 08:19)  Dupixent Pre-filled Pen 300 mg/2 mL subcutaneous solution: 300 milligram(s) subcutaneously 2 times a month last use  25 (2025 08:19)      Vital Signs Last 24 Hrs  T(C): 36.7 (2025 08:25), Max: 36.7 (2025 08:25)  T(F): 98 (2025 08:25), Max: 98 (2025 08:25)  HR: 92 (2025 08:25) (92 - 92)  BP: 141/67 (2025 08:25) (141/67 - 141/67)  BP(mean): --  RR: 16 (2025 08:25) (16 - 16)  SpO2: 99% (2025 08:25) (99% - 99%)      REVIEW OF SYSTEMS:   unable to assess 2/2 sedation    MEDICATIONS  (STANDING):  BUPivacaine liposome 1.3% Injectable 20 milliLiter(s) Local Injection once    MEDICATIONS  (PRN):    PHYSICAL EXAM:    GENERAL: Comfortable, no acute distress   HEAD:  Normocephalic, atraumatic  EYES: EOMI, PERRLA  HEENT: Moist mucous membranes  NECK: Supple, No JVD  NERVOUS SYSTEM:  still sedated  CHEST/LUNG: Clear to auscultation bilaterally  HEART: Regular rate and rhythm  ABDOMEN: Soft, obese, Bowel sounds absent, RLQ ileostomy with viable stoma, lap sites c/d/i with dermabond, mid line NP seal  GENITOURINARY: Voiding, no palpable bladder  EXTREMITIES:   No clubbing, cyanosis, or edema  MUSCULOSKELETAL- No muscle tenderness, no joint tenderness  SKIN-no rash      LABS: preop labs reviewed      IMPRESSION:  This is a 63 yo female with pmh: diverticulitis, asthma, mod persistent, who had a colonoscopy in march and found to have mass, 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 She did have 1 round of chemo u=in April so far.  She was referred to Dr. Wiley and pt is now seen in PACU, S/P LAR with creation of Ileostomy.  We are consulted for medical management      pain control  IVF's  whittaker  Monitor I& O  Monitor Cbc, BMP  BGM per CRS protocol  Cont Abxs  diet by CRS  Enterag      # asthma, mod persistent, controlled  no sob or wheezing noted currently  cont albuterol inhaler, singulair, symbicort  gets allergy shots every 2 weeks, Dupixent injections    #VTE prophylaxis  lovenox  Venodynes  Amb

## 2025-06-05 NOTE — OPERATIVE REPORT - LEVEL OF LIGATION OF INFERIOR MESENTERIC VEIN
"Initial /64 (BP Location: Right arm, Patient Position: Sitting, Cuff Size: Adult Regular)   Pulse 99   Temp 97.6  F (36.4  C) (Tympanic)   Resp 14   Ht 1.626 m (5' 4\")   Wt 82.7 kg (182 lb 6.4 oz)   LMP 12/09/2020   BMI 31.31 kg/m   Estimated body mass index is 31.31 kg/m  as calculated from the following:    Height as of this encounter: 1.626 m (5' 4\").    Weight as of this encounter: 82.7 kg (182 lb 6.4 oz). .      " High

## 2025-06-06 ENCOUNTER — TRANSCRIPTION ENCOUNTER (OUTPATIENT)
Age: 65
End: 2025-06-06

## 2025-06-06 LAB
ANION GAP SERPL CALC-SCNC: 7 MMOL/L — SIGNIFICANT CHANGE UP (ref 5–17)
BUN SERPL-MCNC: 7 MG/DL — SIGNIFICANT CHANGE UP (ref 7–23)
CALCIUM SERPL-MCNC: 8.7 MG/DL — SIGNIFICANT CHANGE UP (ref 8.5–10.1)
CHLORIDE SERPL-SCNC: 113 MMOL/L — HIGH (ref 96–108)
CO2 SERPL-SCNC: 21 MMOL/L — LOW (ref 22–31)
CREAT SERPL-MCNC: 0.72 MG/DL — SIGNIFICANT CHANGE UP (ref 0.5–1.3)
EGFR: 93 ML/MIN/1.73M2 — SIGNIFICANT CHANGE UP
EGFR: 93 ML/MIN/1.73M2 — SIGNIFICANT CHANGE UP
GLUCOSE SERPL-MCNC: 124 MG/DL — HIGH (ref 70–99)
HCT VFR BLD CALC: 34 % — LOW (ref 34.5–45)
HGB BLD-MCNC: 11.2 G/DL — LOW (ref 11.5–15.5)
MAGNESIUM SERPL-MCNC: 1.9 MG/DL — SIGNIFICANT CHANGE UP (ref 1.6–2.6)
MCHC RBC-ENTMCNC: 29.3 PG — SIGNIFICANT CHANGE UP (ref 27–34)
MCHC RBC-ENTMCNC: 32.9 G/DL — SIGNIFICANT CHANGE UP (ref 32–36)
MCV RBC AUTO: 89 FL — SIGNIFICANT CHANGE UP (ref 80–100)
NRBC # BLD AUTO: 0 K/UL — SIGNIFICANT CHANGE UP (ref 0–0)
NRBC # FLD: 0 K/UL — SIGNIFICANT CHANGE UP (ref 0–0)
NRBC BLD AUTO-RTO: 0 /100 WBCS — SIGNIFICANT CHANGE UP (ref 0–0)
PHOSPHATE SERPL-MCNC: 1.8 MG/DL — LOW (ref 2.5–4.5)
PLATELET # BLD AUTO: 296 K/UL — SIGNIFICANT CHANGE UP (ref 150–400)
PMV BLD: 9 FL — SIGNIFICANT CHANGE UP (ref 7–13)
POTASSIUM SERPL-MCNC: 3.9 MMOL/L — SIGNIFICANT CHANGE UP (ref 3.5–5.3)
POTASSIUM SERPL-SCNC: 3.9 MMOL/L — SIGNIFICANT CHANGE UP (ref 3.5–5.3)
RBC # BLD: 3.82 M/UL — SIGNIFICANT CHANGE UP (ref 3.8–5.2)
RBC # FLD: 13.2 % — SIGNIFICANT CHANGE UP (ref 10.3–14.5)
SODIUM SERPL-SCNC: 141 MMOL/L — SIGNIFICANT CHANGE UP (ref 135–145)
WBC # BLD: 16.31 K/UL — HIGH (ref 3.8–10.5)
WBC # FLD AUTO: 16.31 K/UL — HIGH (ref 3.8–10.5)

## 2025-06-06 PROCEDURE — 99233 SBSQ HOSP IP/OBS HIGH 50: CPT

## 2025-06-06 PROCEDURE — 93010 ELECTROCARDIOGRAM REPORT: CPT

## 2025-06-06 RX ORDER — ACETAMINOPHEN 500 MG/5ML
975 LIQUID (ML) ORAL EVERY 6 HOURS
Refills: 0 | Status: DISCONTINUED | OUTPATIENT
Start: 2025-06-06 | End: 2025-06-09

## 2025-06-06 RX ORDER — MAGNESIUM SULFATE 500 MG/ML
1 SYRINGE (ML) INJECTION ONCE
Refills: 0 | Status: COMPLETED | OUTPATIENT
Start: 2025-06-06 | End: 2025-06-06

## 2025-06-06 RX ORDER — POTASSIUM PHOSPHATE, MONOBASIC POTASSIUM PHOSPHATE, DIBASIC INJECTION, 236; 224 MG/ML; MG/ML
15 SOLUTION, CONCENTRATE INTRAVENOUS EVERY 6 HOURS
Refills: 0 | Status: COMPLETED | OUTPATIENT
Start: 2025-06-06 | End: 2025-06-06

## 2025-06-06 RX ORDER — OXYCODONE HYDROCHLORIDE AND ACETAMINOPHEN 10; 325 MG/1; MG/1
1 TABLET ORAL
Qty: 20 | Refills: 0
Start: 2025-06-06

## 2025-06-06 RX ADMIN — Medication 4 MILLIGRAM(S): at 23:36

## 2025-06-06 RX ADMIN — Medication 975 MILLIGRAM(S): at 21:30

## 2025-06-06 RX ADMIN — KETOROLAC TROMETHAMINE 15 MILLIGRAM(S): 30 INJECTION, SOLUTION INTRAMUSCULAR; INTRAVENOUS at 08:01

## 2025-06-06 RX ADMIN — ENOXAPARIN SODIUM 40 MILLIGRAM(S): 100 INJECTION SUBCUTANEOUS at 08:01

## 2025-06-06 RX ADMIN — POTASSIUM PHOSPHATE, MONOBASIC POTASSIUM PHOSPHATE, DIBASIC INJECTION, 62.5 MILLIMOLE(S): 236; 224 SOLUTION, CONCENTRATE INTRAVENOUS at 10:16

## 2025-06-06 RX ADMIN — KETOROLAC TROMETHAMINE 15 MILLIGRAM(S): 30 INJECTION, SOLUTION INTRAMUSCULAR; INTRAVENOUS at 13:55

## 2025-06-06 RX ADMIN — Medication 3 MILLILITER(S): at 05:55

## 2025-06-06 RX ADMIN — Medication 100 GRAM(S): at 10:16

## 2025-06-06 RX ADMIN — Medication 100 GRAM(S): at 00:03

## 2025-06-06 RX ADMIN — OXYCODONE HYDROCHLORIDE 10 MILLIGRAM(S): 30 TABLET ORAL at 23:36

## 2025-06-06 RX ADMIN — Medication 3 MILLILITER(S): at 13:47

## 2025-06-06 RX ADMIN — POTASSIUM CHLORIDE, DEXTROSE MONOHYDRATE AND SODIUM CHLORIDE 125 MILLILITER(S): 150; 5; 900 INJECTION, SOLUTION INTRAVENOUS at 04:38

## 2025-06-06 RX ADMIN — Medication 1000 MILLIGRAM(S): at 05:46

## 2025-06-06 RX ADMIN — POTASSIUM CHLORIDE, DEXTROSE MONOHYDRATE AND SODIUM CHLORIDE 75 MILLILITER(S): 150; 5; 900 INJECTION, SOLUTION INTRAVENOUS at 20:43

## 2025-06-06 RX ADMIN — Medication 3 MILLILITER(S): at 20:19

## 2025-06-06 RX ADMIN — POTASSIUM PHOSPHATE, MONOBASIC POTASSIUM PHOSPHATE, DIBASIC INJECTION, 62.5 MILLIMOLE(S): 236; 224 SOLUTION, CONCENTRATE INTRAVENOUS at 14:30

## 2025-06-06 RX ADMIN — Medication 975 MILLIGRAM(S): at 20:35

## 2025-06-06 RX ADMIN — POTASSIUM CHLORIDE, DEXTROSE MONOHYDRATE AND SODIUM CHLORIDE 75 MILLILITER(S): 150; 5; 900 INJECTION, SOLUTION INTRAVENOUS at 11:51

## 2025-06-06 RX ADMIN — Medication 400 MILLIGRAM(S): at 05:16

## 2025-06-06 RX ADMIN — Medication 400 MILLIGRAM(S): at 11:39

## 2025-06-06 RX ADMIN — KETOROLAC TROMETHAMINE 15 MILLIGRAM(S): 30 INJECTION, SOLUTION INTRAMUSCULAR; INTRAVENOUS at 08:31

## 2025-06-06 NOTE — PROGRESS NOTE ADULT - SUBJECTIVE AND OBJECTIVE BOX
SURGERY DAILY PROGRESS NOTE:     Subjective:  Patient seen and examined at bedside during am rounds. AVSS. Denies any fevers, chills, n/v/d, chest pain or shortness of breath. Pt pain controlled on regimen, some liquid ostomy function noted. Ambulating, making adequate UOP.     Objective:    MEDICATIONS  (STANDING):  acetaminophen   IVPB .. 1000 milliGRAM(s) IV Intermittent every 6 hours  alvimopan 12 milliGRAM(s) Oral every 12 hours  BUPivacaine liposome 1.3% Injectable 20 milliLiter(s) Local Injection once  dextrose 5% + sodium chloride 0.9% with potassium chloride 20 mEq/L 1000 milliLiter(s) (125 mL/Hr) IV Continuous <Continuous>  enoxaparin Injectable 40 milliGRAM(s) SubCutaneous every 24 hours  ketorolac   Injectable 15 milliGRAM(s) IV Push every 6 hours  sodium chloride 0.9% lock flush 3 milliLiter(s) IV Push every 8 hours    MEDICATIONS  (PRN):  ondansetron Injectable 4 milliGRAM(s) IV Push every 6 hours PRN Nausea and/or Vomiting  oxyCODONE    IR 5 milliGRAM(s) Oral every 4 hours PRN Moderate Pain (4 - 6)  oxyCODONE    IR 10 milliGRAM(s) Oral every 4 hours PRN Severe Pain (7 - 10)  prochlorperazine   Injectable 10 milliGRAM(s) IV Push every 6 hours PRN Nausea/Vomiting      Vital Signs Last 24 Hrs  T(C): 36.4 (05 Jun 2025 15:41), Max: 36.7 (05 Jun 2025 08:25)  T(F): 97.5 (05 Jun 2025 15:41), Max: 98 (05 Jun 2025 08:25)  HR: 90 (05 Jun 2025 15:41) (73 - 95)  BP: 136/67 (05 Jun 2025 15:41) (106/58 - 141/67)  BP(mean): --  RR: 18 (05 Jun 2025 15:41) (14 - 18)  SpO2: 97% (05 Jun 2025 15:41) (97% - 100%)    Parameters below as of 05 Jun 2025 15:41  Patient On (Oxygen Delivery Method): room air          PHYSICAL EXAM   Gen: well-appearing, in no acute distress  CV: pulse regularly present   Resp: airway patent, non-labored breathing  Abd: soft, appropriately tender, ND; no rebound or guarding. Incision c/d/i, ostomy pink and patent with liquid output  Ext. no cyanosis or edema      I&O's Detail    05 Jun 2025 07:01  -  06 Jun 2025 01:43  --------------------------------------------------------  IN:    Other (mL): 3000 mL  Total IN: 3000 mL    OUT:    Indwelling Catheter - Urethral (mL): 745 mL  Total OUT: 745 mL    Total NET: 2255 mL

## 2025-06-06 NOTE — PROGRESS NOTE ADULT - SUBJECTIVE AND OBJECTIVE BOX
CC: Colon CA  HPI:  63 yo female with PMH of  Diverticulitis, Asthma  who had a colonoscopy in April and was found to have mass, biopsy was done,  + for invasive adenocarcinoma of the rectum.  Pt  was referred to Dr. Wiley and  now S/P LAR with creation of Ileostomy.  Hospitalist service called for medical  management     INTERVAL HPI/OVERNIGHT EVENTS:    Vital Signs Last 24 Hrs  T(C): 36.7 (06 Jun 2025 03:48), Max: 36.7 (06 Jun 2025 03:48)  T(F): 98.1 (06 Jun 2025 03:48), Max: 98.1 (06 Jun 2025 03:48)  HR: 81 (06 Jun 2025 03:48) (73 - 95)  BP: 113/54 (06 Jun 2025 03:48) (106/58 - 136/67)  RR: 18 (06 Jun 2025 03:48) (14 - 18)  SpO2: 97% (06 Jun 2025 03:48) (97% - 100%)    Parameters below as of 06 Jun 2025 03:48  Patient On (Oxygen Delivery Method): room air        REVIEW OF SYSTEMS:  All other review of systems is negative unless indicated above.        PHYSICAL EXAM:  General: in no acute distress  Eyes: PERRLA, EOMI; conjunctiva and sclera clear  Head: Normocephalic; atraumatic  ENMT: No nasal discharge; airway clear  Neck: Supple; non tender; no masses  Respiratory: No wheezes, rales or rhonchi  Cardiovascular: Regular rate and rhythm. S1 and S2 Normal; No murmurs, gallops or rubs  Gastrointestinal: Soft non-tender non-distended; Normal bowel sounds  Genitourinary: No  suprapubic  tenderness  Extremities: Normal range of motion, No clubbing, cyanosis or edema  Vascular: Peripheral pulses palpable 2+ bilaterally  Neurological: Alert and oriented x4  Skin: Warm and dry. No acute rash  Lymph Nodes: No acute cervical adenopathy  Musculoskeletal: Normal muscle tone, without deformities  Psychiatric: Cooperative and appropriate        LABS:                        11.2   16.31 )-----------( 296      ( 06 Jun 2025 06:27 )             34.0     06 Jun 2025 06:27    141    |  113    |  7      ----------------------------<  124    3.9     |  21     |  0.72     Ca    8.7        06 Jun 2025 06:27  Phos  1.8       06 Jun 2025 06:27  Mg     1.9       06 Jun 2025 06:27      POCT Blood Glucose.: 159 mg/dL (05 Jun 2025 14:33)    Urinalysis Basic - ( 06 Jun 2025 06:27 )  Color: x / Appearance: x / SG: x / pH: x  Gluc: 124 mg/dL / Ketone: x  / Bili: x / Urobili: x   Blood: x / Protein: x / Nitrite: x   Leuk Esterase: x / RBC: x / WBC x   Sq Epi: x / Non Sq Epi: x / Bacteria: x        MEDICATIONS  (STANDING):  acetaminophen   IVPB .. 1000 milliGRAM(s) IV Intermittent every 6 hours  BUPivacaine liposome 1.3% Injectable 20 milliLiter(s) Local Injection once  dextrose 5% + sodium chloride 0.9% with potassium chloride 20 mEq/L 1000 milliLiter(s) (125 mL/Hr) IV Continuous <Continuous>  enoxaparin Injectable 40 milliGRAM(s) SubCutaneous every 24 hours  ketorolac   Injectable 15 milliGRAM(s) IV Push every 6 hours  sodium chloride 0.9% lock flush 3 milliLiter(s) IV Push every 8 hours    MEDICATIONS  (PRN):  ondansetron Injectable 4 milliGRAM(s) IV Push every 6 hours PRN Nausea and/or Vomiting  oxyCODONE    IR 5 milliGRAM(s) Oral every 4 hours PRN Moderate Pain (4 - 6)  oxyCODONE    IR 10 milliGRAM(s) Oral every 4 hours PRN Severe Pain (7 - 10)  prochlorperazine   Injectable 10 milliGRAM(s) IV Push every 6 hours PRN Nausea/Vomiting      RADIOLOGY & ADDITIONAL TESTS: CC: Colon CA  HPI:  65 yo female with PMH of  Diverticulitis, Asthma  who had a colonoscopy in April and was found to have mass, biopsy was done,  + for invasive adenocarcinoma of the rectum.  Pt  was referred to Dr. Wiley and  now S/P LAR with creation of Ileostomy.  Hospitalist service called for medical  management     INTERVAL HPI/ OVERNIGHT EVENTS: Pt  was seen and examined, reports doing well, pain controlled, Tolerates diet. Denies CP or SOB or wheezing.  Results and plan discussed    Vital Signs Last 24 Hrs  T(C): 36.7 (06 Jun 2025 03:48), Max: 36.7 (06 Jun 2025 03:48)  T(F): 98.1 (06 Jun 2025 03:48), Max: 98.1 (06 Jun 2025 03:48)  HR: 81 (06 Jun 2025 03:48) (73 - 95)  BP: 113/54 (06 Jun 2025 03:48) (106/58 - 136/67)  RR: 18 (06 Jun 2025 03:48) (14 - 18)  SpO2: 97% (06 Jun 2025 03:48) (97% - 100%)    Parameters below as of 06 Jun 2025 03:48  Patient On (Oxygen Delivery Method): room air        REVIEW OF SYSTEMS:  All other review of systems is negative unless indicated above.        PHYSICAL EXAM:  General: in no acute distress  Eyes: EOMI; conjunctiva and sclera clear  Head: Normocephalic; atraumatic  ENMT: No nasal discharge; airway clear  Respiratory: Decreased BS at bases No wheezes  Cardiovascular: Regular rate and rhythm. S1 and S2 Normal;   Gastrointestinal: Soft non-tender non-distended; + bowel sounds, LLQ ostomy in place, contains dark fluid   Genitourinary: No  suprapubic  tenderness, Yancey  in place   Extremities: No edema  Neurological: Alert and oriented x4, non focal  Musculoskeletal: Normal muscle tone, without deformities  Psychiatric: Cooperative        LABS:                        11.2   16.31 )-----------( 296      ( 06 Jun 2025 06:27 )             34.0     06 Jun 2025 06:27    141    |  113    |  7      ----------------------------<  124    3.9     |  21     |  0.72     Ca    8.7        06 Jun 2025 06:27  Phos  1.8       06 Jun 2025 06:27  Mg     1.9       06 Jun 2025 06:27      POCT Blood Glucose.: 159 mg/dL (05 Jun 2025 14:33)    Urinalysis Basic - ( 06 Jun 2025 06:27 )  Color: x / Appearance: x / SG: x / pH: x  Gluc: 124 mg/dL / Ketone: x  / Bili: x / Urobili: x   Blood: x / Protein: x / Nitrite: x   Leuk Esterase: x / RBC: x / WBC x   Sq Epi: x / Non Sq Epi: x / Bacteria: x        MEDICATIONS  (STANDING):  acetaminophen   IVPB .. 1000 milliGRAM(s) IV Intermittent every 6 hours  BUPivacaine liposome 1.3% Injectable 20 milliLiter(s) Local Injection once  dextrose 5% + sodium chloride 0.9% with potassium chloride 20 mEq/L 1000 milliLiter(s) (125 mL/Hr) IV Continuous <Continuous>  enoxaparin Injectable 40 milliGRAM(s) SubCutaneous every 24 hours  ketorolac   Injectable 15 milliGRAM(s) IV Push every 6 hours  sodium chloride 0.9% lock flush 3 milliLiter(s) IV Push every 8 hours    MEDICATIONS  (PRN):  ondansetron Injectable 4 milliGRAM(s) IV Push every 6 hours PRN Nausea and/or Vomiting  oxyCODONE    IR 5 milliGRAM(s) Oral every 4 hours PRN Moderate Pain (4 - 6)  oxyCODONE    IR 10 milliGRAM(s) Oral every 4 hours PRN Severe Pain (7 - 10)  prochlorperazine   Injectable 10 milliGRAM(s) IV Push every 6 hours PRN Nausea/Vomiting      RADIOLOGY & ADDITIONAL TESTS:

## 2025-06-06 NOTE — DISCHARGE NOTE PROVIDER - NSDCCPTREATMENT_GEN_ALL_CORE_FT
PRINCIPAL PROCEDURE  Procedure: Laparoscopic assisted low anterior resection  Findings and Treatment:       SECONDARY PROCEDURE  Procedure: Creation, ileostomy, loop, open  Findings and Treatment:     Procedure: Rigid proctosigmoidoscpy  Findings and Treatment:     Procedure: Mobilization of splenic flexure  Findings and Treatment:     
Admission

## 2025-06-06 NOTE — PROGRESS NOTE ADULT - ASSESSMENT
ASSESSMENT/PLAN:  65 yo F with early rectal Ca, s/p low LAR and DLI    Plan:  Regular diet  c/w Yancey, likely out POD2  Ambulate/OOBTC  Appreciate hospitalist comgmt  Monitor ostomy function  Pain control PRN  Antiemetic PRN  Dvt ppx    d/w CRS team and attending ASSESSMENT/PLAN:  63 yo F with early rectal Ca, s/p low LAR and DLI 6/5    Plan:  Regular diet  c/w Yancey, likely out POD2  Ambulate/OOBTC  Appreciate hospitalist comgmt  Monitor ostomy function  Pain control PRN  Antiemetic PRN  Dvt ppx  f/u AM labs    d/w CRS team and attending

## 2025-06-06 NOTE — DISCHARGE NOTE PROVIDER - NSDCFUADDINST_GEN_ALL_CORE_FT
Our office will reach out to you next week  for bloodwork to be done  Increase hydration  May take imodium 2mg three times a day and Metamucil fiber as directed if ileostomy output is very watery and over 1Liter per day.  Small frequent meals   may shower  Home care will be setup for new stoma   warm compress to area over incision as needed for pain     Please read the instructions outlined below and refer to them for the next few weeks. These discharge instructions provide you with general information on caring for yourself after surgery. While your treatment has been planned according to the most current medical practices available, unavoidable complications occasionally occur. If you have any problems or questions after discharge, please call your surgeon.    DIET: Soft foods.  Eat six smaller  meals, rather than three main meals for the first week or two.     ACTIVITY: It is also normal to feel tired and take naps in the afternoon. Avoid lifting over 10 pounds. You may shower, but no tub bathing. Stairs are okay. You may ride in a car. Walking is encouraged. You may drive if not on oral narcotic  pain meds. Be advised narcotics such as Percocet can cause drowsiness.     MEDICATIONS: (Please refer to the hospital discharge medication form)    DRESSING: May remove abdominal dressing if present 1 week from date of surgery, and leave open to the air.     Resume all your usual pre-surgery medicines.  It is normal to be sore for 2-4 weeks following surgery. Call your surgeon if this seems to be getting worse rather than better. Only take over-the-counter or prescription medicines for pain, discomfort, or fever as directed by your surgeon.    CALL YOUR SURGEONS OFFICE IF YOU DEVELOP:    An wound which becomes red, swollen, increasingly painful or begins to bleed/drain.  An unexplained temperature over 101° F (38.3° C).  Experience worsening abdominal pain, nausea, or vomiting.  Bleeding with bowel movements    FOLLOW UP:  Notes the date for your after surgery appointment. Your postoperative course and pathology report will be reviewed. All questions will be answered, and continued care instructions given.

## 2025-06-06 NOTE — DISCHARGE NOTE PROVIDER - HOSPITAL COURSE
Pt with an early T2 rectal cancer, s/p low anterior resection with diverting loop ileostomy. Postop course with stoma function, tolerating diet, ambulating. Pain was controlled with stable vital signs.

## 2025-06-06 NOTE — DISCHARGE NOTE PROVIDER - NSDCMRMEDTOKEN_GEN_ALL_CORE_FT
allergy shot every 3 weeks :   Dupixent Pre-filled Pen 300 mg/2 mL subcutaneous solution: 300 milligram(s) subcutaneously 2 times a month last use  5/18/25  oxycodone-acetaminophen 5 mg-325 mg oral tablet: 1 tab(s) orally every 6 hours as needed for  moderate pain MDD: 004   acetaminophen 325 mg oral tablet: 3 tab(s) orally every 6 hours as needed for Mild Pain (1 - 3)  allergy shot every 3 weeks :   Dupixent Pre-filled Pen 300 mg/2 mL subcutaneous solution: 300 milligram(s) subcutaneously 2 times a month last use  5/18/25  oxycodone-acetaminophen 5 mg-325 mg oral tablet: 1 tab(s) orally every 6 hours as needed for  moderate pain MDD: 004

## 2025-06-06 NOTE — DISCHARGE NOTE PROVIDER - CARE PROVIDER_API CALL
William Wiley  Colon/Rectal Surgery  [address], [facility] [place] [address]  Phone: [phone]  Fax: [phone]  Follow Up Time: 2 weeks

## 2025-06-06 NOTE — PROGRESS NOTE ADULT - ASSESSMENT
65 yo female with PMH of  Diverticulitis, Asthma admitted for:     1. Rectal  Invasive Adenocarcinoma. S/p Laparoscopic assisted low anterior resection Creatio of  ileostomy. POD #1   Management as per Sx  team   Control pain On Oxy and Tylenol, Ketorolac   Control Nausea on prochlorperazine   C/w IVF's until PO intake adequate   DC Yancey as per Sx team   Monitor I& O  Monitor Cbc, BMP, monitor and replace lytes   Cont Post procedure  Abxs  F/u Pathology       # Asthma, mod persistent, controlled  Monitor pulse ox, supplement O2 PRN   cont albuterol inhaler, Singular and  Symbicort  gets allergy shots every 2 weeks, Dupixent injections    # Hypomagnesemia and Hypophosphatemia  Replace IV   Recheck in am       #VTE prophylaxis  lovenox  Venodynes  Ambulate       Thank you for consult, will follow

## 2025-06-07 LAB
ANION GAP SERPL CALC-SCNC: 3 MMOL/L — LOW (ref 5–17)
BUN SERPL-MCNC: 4 MG/DL — LOW (ref 7–23)
CALCIUM SERPL-MCNC: 8.7 MG/DL — SIGNIFICANT CHANGE UP (ref 8.5–10.1)
CHLORIDE SERPL-SCNC: 112 MMOL/L — HIGH (ref 96–108)
CO2 SERPL-SCNC: 25 MMOL/L — SIGNIFICANT CHANGE UP (ref 22–31)
CREAT SERPL-MCNC: 0.57 MG/DL — SIGNIFICANT CHANGE UP (ref 0.5–1.3)
EGFR: 101 ML/MIN/1.73M2 — SIGNIFICANT CHANGE UP
EGFR: 101 ML/MIN/1.73M2 — SIGNIFICANT CHANGE UP
GLUCOSE SERPL-MCNC: 119 MG/DL — HIGH (ref 70–99)
HCT VFR BLD CALC: 33 % — LOW (ref 34.5–45)
HGB BLD-MCNC: 10.9 G/DL — LOW (ref 11.5–15.5)
MAGNESIUM SERPL-MCNC: 1.7 MG/DL — SIGNIFICANT CHANGE UP (ref 1.6–2.6)
MCHC RBC-ENTMCNC: 29.8 PG — SIGNIFICANT CHANGE UP (ref 27–34)
MCHC RBC-ENTMCNC: 33 G/DL — SIGNIFICANT CHANGE UP (ref 32–36)
MCV RBC AUTO: 90.2 FL — SIGNIFICANT CHANGE UP (ref 80–100)
NRBC # BLD AUTO: 0 K/UL — SIGNIFICANT CHANGE UP (ref 0–0)
NRBC # FLD: 0 K/UL — SIGNIFICANT CHANGE UP (ref 0–0)
NRBC BLD AUTO-RTO: 0 /100 WBCS — SIGNIFICANT CHANGE UP (ref 0–0)
PHOSPHATE SERPL-MCNC: 2.3 MG/DL — LOW (ref 2.5–4.5)
PLATELET # BLD AUTO: 304 K/UL — SIGNIFICANT CHANGE UP (ref 150–400)
PMV BLD: 8.8 FL — SIGNIFICANT CHANGE UP (ref 7–13)
POTASSIUM SERPL-MCNC: 4.1 MMOL/L — SIGNIFICANT CHANGE UP (ref 3.5–5.3)
POTASSIUM SERPL-SCNC: 4.1 MMOL/L — SIGNIFICANT CHANGE UP (ref 3.5–5.3)
RBC # BLD: 3.66 M/UL — LOW (ref 3.8–5.2)
RBC # FLD: 13.3 % — SIGNIFICANT CHANGE UP (ref 10.3–14.5)
SODIUM SERPL-SCNC: 140 MMOL/L — SIGNIFICANT CHANGE UP (ref 135–145)
WBC # BLD: 13.52 K/UL — HIGH (ref 3.8–10.5)
WBC # FLD AUTO: 13.52 K/UL — HIGH (ref 3.8–10.5)

## 2025-06-07 PROCEDURE — 99232 SBSQ HOSP IP/OBS MODERATE 35: CPT

## 2025-06-07 RX ORDER — SODIUM PHOSPHATE,DIBASIC DIHYD
15 POWDER (GRAM) MISCELLANEOUS ONCE
Refills: 0 | Status: COMPLETED | OUTPATIENT
Start: 2025-06-07 | End: 2025-06-07

## 2025-06-07 RX ORDER — MAGNESIUM SULFATE 500 MG/ML
1 SYRINGE (ML) INJECTION ONCE
Refills: 0 | Status: COMPLETED | OUTPATIENT
Start: 2025-06-07 | End: 2025-06-07

## 2025-06-07 RX ADMIN — Medication 62.5 MILLIMOLE(S): at 10:54

## 2025-06-07 RX ADMIN — ENOXAPARIN SODIUM 40 MILLIGRAM(S): 100 INJECTION SUBCUTANEOUS at 09:41

## 2025-06-07 RX ADMIN — OXYCODONE HYDROCHLORIDE 10 MILLIGRAM(S): 30 TABLET ORAL at 13:16

## 2025-06-07 RX ADMIN — OXYCODONE HYDROCHLORIDE 10 MILLIGRAM(S): 30 TABLET ORAL at 00:30

## 2025-06-07 RX ADMIN — OXYCODONE HYDROCHLORIDE 10 MILLIGRAM(S): 30 TABLET ORAL at 07:01

## 2025-06-07 RX ADMIN — OXYCODONE HYDROCHLORIDE 10 MILLIGRAM(S): 30 TABLET ORAL at 22:30

## 2025-06-07 RX ADMIN — Medication 3 MILLILITER(S): at 13:13

## 2025-06-07 RX ADMIN — Medication 4 MILLIGRAM(S): at 21:33

## 2025-06-07 RX ADMIN — OXYCODONE HYDROCHLORIDE 10 MILLIGRAM(S): 30 TABLET ORAL at 06:04

## 2025-06-07 RX ADMIN — Medication 975 MILLIGRAM(S): at 06:02

## 2025-06-07 RX ADMIN — Medication 100 GRAM(S): at 09:40

## 2025-06-07 RX ADMIN — Medication 4 MILLIGRAM(S): at 06:03

## 2025-06-07 RX ADMIN — Medication 975 MILLIGRAM(S): at 05:12

## 2025-06-07 RX ADMIN — Medication 3 MILLILITER(S): at 21:32

## 2025-06-07 RX ADMIN — OXYCODONE HYDROCHLORIDE 10 MILLIGRAM(S): 30 TABLET ORAL at 21:33

## 2025-06-07 RX ADMIN — Medication 3 MILLILITER(S): at 03:56

## 2025-06-07 NOTE — PROGRESS NOTE ADULT - SUBJECTIVE AND OBJECTIVE BOX
SURGERY DAILY PROGRESS NOTE:     Subjective:  Patient seen and examined at bedside during am rounds. AVSS. Denies any fevers, chills, n/v/d, chest pain or shortness of breath. Pt pain controlled on regimen, liquid ostomy function. Ambulating, making adequate UOP.     Objective:    PHYSICAL EXAM   Gen: well-appearing, in no acute distress  CV: pulse regularly present   Resp: airway patent, non-labored breathing  Abd: soft, appropriately tender, ND; no rebound or guarding. Incision c/d/i, ostomy pink and patent with liquid output  Yancey in place  Ext. no cyanosis or edema      Vital Signs Last 24 Hrs  T(C): 36.8 (07 Jun 2025 03:28), Max: 37 (06 Jun 2025 21:58)  T(F): 98.2 (07 Jun 2025 03:28), Max: 98.6 (06 Jun 2025 21:58)  HR: 84 (07 Jun 2025 03:28) (74 - 87)  BP: 119/52 (07 Jun 2025 03:28) (119/47 - 134/50)  BP(mean): --  RR: 18 (07 Jun 2025 03:28) (18 - 19)  SpO2: 96% (07 Jun 2025 03:28) (95% - 96%)    Parameters below as of 07 Jun 2025 03:28  Patient On (Oxygen Delivery Method): room air      Daily     Daily   I&O's Detail    06 Jun 2025 07:01  -  07 Jun 2025 07:00  --------------------------------------------------------  IN:  Total IN: 0 mL    OUT:    Ileostomy (mL): 800 mL    Indwelling Catheter - Urethral (mL): 4550 mL  Total OUT: 5350 mL    Total NET: -5350 mL                              10.9   13.52 )-----------( 304      ( 07 Jun 2025 06:39 )             33.0     06-07    140  |  112[H]  |  4[L]  ----------------------------<  119[H]  4.1   |  25  |  0.57    Ca    8.7      07 Jun 2025 06:39  Phos  1.8     06-06  Mg     1.9     06-06        Urinalysis Basic - ( 07 Jun 2025 06:39 )    Color: x / Appearance: x / SG: x / pH: x  Gluc: 119 mg/dL / Ketone: x  / Bili: x / Urobili: x   Blood: x / Protein: x / Nitrite: x   Leuk Esterase: x / RBC: x / WBC x   Sq Epi: x / Non Sq Epi: x / Bacteria: x

## 2025-06-07 NOTE — PROGRESS NOTE ADULT - SUBJECTIVE AND OBJECTIVE BOX
CC: Colon CA  HPI:  63 yo female with PMH of  Diverticulitis, Asthma  who had a colonoscopy in April and was found to have mass, biopsy was done,  + for invasive adenocarcinoma of the rectum.  Pt  was referred to Dr. Wiley and  now S/P LAR with creation of Ileostomy.  Hospitalist service called for medical  management     INTERVAL HPI/ OVERNIGHT EVENTS: Pt  was seen and examined,   Vital Signs Last 24 Hrs  T(C): 36.9 (07 Jun 2025 08:15), Max: 37 (06 Jun 2025 21:58)  T(F): 98.4 (07 Jun 2025 08:15), Max: 98.6 (06 Jun 2025 21:58)  HR: 83 (07 Jun 2025 08:15) (74 - 87)  BP: 128/64 (07 Jun 2025 08:15) (119/47 - 134/50)  RR: 18 (07 Jun 2025 08:15) (18 - 19)  SpO2: 95% (07 Jun 2025 08:15) (95% - 96%)    Parameters below as of 07 Jun 2025 08:15  Patient On (Oxygen Delivery Method): room air        REVIEW OF SYSTEMS:  All other review of systems is negative unless indicated above.        PHYSICAL EXAM:  General: in no acute distress  Eyes: EOMI; conjunctiva and sclera clear  Head: Normocephalic; atraumatic  ENMT: No nasal discharge; airway clear  Respiratory: Decreased BS at bases No wheezes  Cardiovascular: Regular rate and rhythm. S1 and S2 Normal;   Gastrointestinal: Soft non-tender non-distended; + bowel sounds, LLQ ostomy in place, contains dark fluid   Genitourinary: No  suprapubic  tenderness, Yancey  in place   Extremities: No edema  Neurological: Alert and oriented x4, non focal  Musculoskeletal: Normal muscle tone, without deformities  Psychiatric: Cooperative        LABS:                         10.9   13.52 )-----------( 304      ( 07 Jun 2025 06:39 )             33.0     06-07    140  |  112[H]  |  4[L]  ----------------------------<  119[H]  4.1   |  25  |  0.57    Ca    8.7      07 Jun 2025 06:39  Phos  2.3     06-07  Mg     1.7     06-07                         11.2   16.31 )-----------( 296      ( 06 Jun 2025 06:27 )             34.0     06 Jun 2025 06:27    141    |  113    |  7      ----------------------------<  124    3.9     |  21     |  0.72     Ca    8.7        06 Jun 2025 06:27  Phos  1.8       06 Jun 2025 06:27  Mg     1.9       06 Jun 2025 06:27      POCT Blood Glucose.: 159 mg/dL (05 Jun 2025 14:33)    Urinalysis Basic - ( 06 Jun 2025 06:27 )  Color: x / Appearance: x / SG: x / pH: x  Gluc: 124 mg/dL / Ketone: x  / Bili: x / Urobili: x   Blood: x / Protein: x / Nitrite: x   Leuk Esterase: x / RBC: x / WBC x   Sq Epi: x / Non Sq Epi: x / Bacteria: x        MEDICATIONS  (STANDING):  acetaminophen   IVPB .. 1000 milliGRAM(s) IV Intermittent every 6 hours  BUPivacaine liposome 1.3% Injectable 20 milliLiter(s) Local Injection once  dextrose 5% + sodium chloride 0.9% with potassium chloride 20 mEq/L 1000 milliLiter(s) (125 mL/Hr) IV Continuous <Continuous>  enoxaparin Injectable 40 milliGRAM(s) SubCutaneous every 24 hours  ketorolac   Injectable 15 milliGRAM(s) IV Push every 6 hours  sodium chloride 0.9% lock flush 3 milliLiter(s) IV Push every 8 hours    MEDICATIONS  (PRN):  ondansetron Injectable 4 milliGRAM(s) IV Push every 6 hours PRN Nausea and/or Vomiting  oxyCODONE    IR 5 milliGRAM(s) Oral every 4 hours PRN Moderate Pain (4 - 6)  oxyCODONE    IR 10 milliGRAM(s) Oral every 4 hours PRN Severe Pain (7 - 10)  prochlorperazine   Injectable 10 milliGRAM(s) IV Push every 6 hours PRN Nausea/Vomiting      RADIOLOGY & ADDITIONAL TESTS: CC: Colon CA  HPI:  65 yo female with PMH of  Diverticulitis, Asthma  who had a colonoscopy in April and was found to have mass, biopsy was done,  + for invasive adenocarcinoma of the rectum.  Pt  was referred to Dr. Wiley and  now S/P LAR with creation of Ileostomy.  Hospitalist service called for medical  management     INTERVAL HPI/ OVERNIGHT EVENTS: Pt  was seen and examined, reports overall is doing well. Pain controlled. Tolerates diet.  Ambulates. Denies SOB. Pt c/o difficulty sleeping at night, also uncomfortable from Yancey. Plan  discussed    Vital Signs Last 24 Hrs  T(C): 36.9 (07 Jun 2025 08:15), Max: 37 (06 Jun 2025 21:58)  T(F): 98.4 (07 Jun 2025 08:15), Max: 98.6 (06 Jun 2025 21:58)  HR: 83 (07 Jun 2025 08:15) (74 - 87)  BP: 128/64 (07 Jun 2025 08:15) (119/47 - 134/50)  RR: 18 (07 Jun 2025 08:15) (18 - 19)  SpO2: 95% (07 Jun 2025 08:15) (95% - 96%)    Parameters below as of 07 Jun 2025 08:15  Patient On (Oxygen Delivery Method): room air      REVIEW OF SYSTEMS:  All other review of systems is negative unless indicated above.        PHYSICAL EXAM:  General: in no acute distress  Eyes: EOMI; conjunctiva and sclera clear  Head: Normocephalic; atraumatic  ENMT: No nasal discharge; airway clear  Respiratory: Decreased BS at bases No wheezes  Cardiovascular: Regular rate and rhythm. S1 and S2 Normal;   Gastrointestinal: Soft non-tender non-distended; + bowel sounds, RLQ ostomy in place, contains dark  brownish fluid   Genitourinary: No  suprapubic  tenderness, Yancey  in place   Extremities: No edema  Neurological: Alert and oriented x4, non focal  Musculoskeletal: Normal muscle tone, without deformities  Psychiatric: Cooperative        LABS:                         10.9   13.52 )-----------( 304      ( 07 Jun 2025 06:39 )             33.0     06-07    140  |  112[H]  |  4[L]  ----------------------------<  119[H]  4.1   |  25  |  0.57    Ca    8.7      07 Jun 2025 06:39  Phos  2.3     06-07  Mg     1.7     06-07                         11.2   16.31 )-----------( 296      ( 06 Jun 2025 06:27 )             34.0     06 Jun 2025 06:27    141    |  113    |  7      ----------------------------<  124    3.9     |  21     |  0.72     Ca    8.7        06 Jun 2025 06:27  Phos  1.8       06 Jun 2025 06:27  Mg     1.9       06 Jun 2025 06:27      POCT Blood Glucose.: 159 mg/dL (05 Jun 2025 14:33)    Urinalysis Basic - ( 06 Jun 2025 06:27 )  Color: x / Appearance: x / SG: x / pH: x  Gluc: 124 mg/dL / Ketone: x  / Bili: x / Urobili: x   Blood: x / Protein: x / Nitrite: x   Leuk Esterase: x / RBC: x / WBC x   Sq Epi: x / Non Sq Epi: x / Bacteria: x        MEDICATIONS  (STANDING):  acetaminophen   IVPB .. 1000 milliGRAM(s) IV Intermittent every 6 hours  BUPivacaine liposome 1.3% Injectable 20 milliLiter(s) Local Injection once  dextrose 5% + sodium chloride 0.9% with potassium chloride 20 mEq/L 1000 milliLiter(s) (125 mL/Hr) IV Continuous <Continuous>  enoxaparin Injectable 40 milliGRAM(s) SubCutaneous every 24 hours  ketorolac   Injectable 15 milliGRAM(s) IV Push every 6 hours  sodium chloride 0.9% lock flush 3 milliLiter(s) IV Push every 8 hours    MEDICATIONS  (PRN):  ondansetron Injectable 4 milliGRAM(s) IV Push every 6 hours PRN Nausea and/or Vomiting  oxyCODONE    IR 5 milliGRAM(s) Oral every 4 hours PRN Moderate Pain (4 - 6)  oxyCODONE    IR 10 milliGRAM(s) Oral every 4 hours PRN Severe Pain (7 - 10)  prochlorperazine   Injectable 10 milliGRAM(s) IV Push every 6 hours PRN Nausea/Vomiting      RADIOLOGY & ADDITIONAL TESTS:

## 2025-06-07 NOTE — PROGRESS NOTE ADULT - ASSESSMENT
65 yo female with PMH of  Diverticulitis, Asthma admitted for:     1. Rectal  Invasive Adenocarcinoma. S/p Laparoscopic assisted low anterior resection Creatio of  ileostomy. POD #1   Management as per Sx  team   Control pain On Oxy and Tylenol, Ketorolac   Control Nausea on prochlorperazine   C/w IVF's until PO intake adequate   DC Yancey as per Sx team   Monitor I& O  Monitor Cbc, BMP, monitor and replace lytes   Cont Post procedure  Abxs  F/u Pathology       # Asthma, mod persistent, controlled  Monitor pulse ox, supplement O2 PRN   cont albuterol inhaler, Singular and  Symbicort  gets allergy shots every 2 weeks, Dupixent injections    # Hypomagnesemia and Hypophosphatemia  Replace IV   Recheck in am       #VTE prophylaxis  lovenox  Venodynes  Ambulate       Thank you for consult, will follow    63 yo female with PMH of  Diverticulitis, Asthma admitted for:     1. Rectal  Invasive Adenocarcinoma. S/p Laparoscopic assisted low anterior resection Creatio of  ileostomy. POD #1   Management as per Sx  team   Control pain On Oxy and Tylenol, Ketorolac   Control Nausea on prochlorperazine   off  IVF's  Consider to DC Yancey today   Monitor I& O  Monitor Cbc, BMP, monitor and replace lytes   completed Post procedure  Abxs  F/u Pathology       # Asthma, mod persistent, controlled  Monitor pulse ox, supplement O2 PRN   cont albuterol inhaler, Singular and  Symbicort  gets allergy shots every 2 weeks, Dupixent injections    # Hypomagnesemia and Hypophosphatemia  Replace IV , ordered   Recheck in am       #VTE prophylaxis  lovenox  Venodynes  Ambulate       Thank you for consult, will follow

## 2025-06-07 NOTE — PROGRESS NOTE ADULT - ASSESSMENT
ASSESSMENT/PLAN:  65 yo F with early rectal Ca, s/p low LAR and DLI 6/5    Plan:  Regular diet  c/w Yancey, likely out today vs tomorrow  Ambulate/OOBTC  Appreciate hospitalist comgmt  Monitor ostomy function  Ostomy teaching  Pain control PRN  Antiemetic PRN  Dvt ppx  f/u AM labs  Likely dispo tomorrow vs Monday    d/w CRS team and attending

## 2025-06-08 LAB
ANION GAP SERPL CALC-SCNC: 6 MMOL/L — SIGNIFICANT CHANGE UP (ref 5–17)
BUN SERPL-MCNC: 5 MG/DL — LOW (ref 7–23)
CALCIUM SERPL-MCNC: 9.2 MG/DL — SIGNIFICANT CHANGE UP (ref 8.5–10.1)
CHLORIDE SERPL-SCNC: 106 MMOL/L — SIGNIFICANT CHANGE UP (ref 96–108)
CO2 SERPL-SCNC: 26 MMOL/L — SIGNIFICANT CHANGE UP (ref 22–31)
CREAT SERPL-MCNC: 0.6 MG/DL — SIGNIFICANT CHANGE UP (ref 0.5–1.3)
EGFR: 100 ML/MIN/1.73M2 — SIGNIFICANT CHANGE UP
EGFR: 100 ML/MIN/1.73M2 — SIGNIFICANT CHANGE UP
GLUCOSE SERPL-MCNC: 114 MG/DL — HIGH (ref 70–99)
HCT VFR BLD CALC: 34.5 % — SIGNIFICANT CHANGE UP (ref 34.5–45)
HGB BLD-MCNC: 11.4 G/DL — LOW (ref 11.5–15.5)
MAGNESIUM SERPL-MCNC: 1.7 MG/DL — SIGNIFICANT CHANGE UP (ref 1.6–2.6)
MCHC RBC-ENTMCNC: 29.4 PG — SIGNIFICANT CHANGE UP (ref 27–34)
MCHC RBC-ENTMCNC: 33 G/DL — SIGNIFICANT CHANGE UP (ref 32–36)
MCV RBC AUTO: 88.9 FL — SIGNIFICANT CHANGE UP (ref 80–100)
NRBC # BLD AUTO: 0 K/UL — SIGNIFICANT CHANGE UP (ref 0–0)
NRBC # FLD: 0 K/UL — SIGNIFICANT CHANGE UP (ref 0–0)
NRBC BLD AUTO-RTO: 0 /100 WBCS — SIGNIFICANT CHANGE UP (ref 0–0)
PHOSPHATE SERPL-MCNC: 3.4 MG/DL — SIGNIFICANT CHANGE UP (ref 2.5–4.5)
PLATELET # BLD AUTO: 297 K/UL — SIGNIFICANT CHANGE UP (ref 150–400)
PMV BLD: 8.8 FL — SIGNIFICANT CHANGE UP (ref 7–13)
POTASSIUM SERPL-MCNC: 3.8 MMOL/L — SIGNIFICANT CHANGE UP (ref 3.5–5.3)
POTASSIUM SERPL-SCNC: 3.8 MMOL/L — SIGNIFICANT CHANGE UP (ref 3.5–5.3)
RBC # BLD: 3.88 M/UL — SIGNIFICANT CHANGE UP (ref 3.8–5.2)
RBC # FLD: 13 % — SIGNIFICANT CHANGE UP (ref 10.3–14.5)
SODIUM SERPL-SCNC: 138 MMOL/L — SIGNIFICANT CHANGE UP (ref 135–145)
WBC # BLD: 12.82 K/UL — HIGH (ref 3.8–10.5)
WBC # FLD AUTO: 12.82 K/UL — HIGH (ref 3.8–10.5)

## 2025-06-08 PROCEDURE — 99231 SBSQ HOSP IP/OBS SF/LOW 25: CPT

## 2025-06-08 RX ORDER — MAGNESIUM SULFATE 500 MG/ML
1 SYRINGE (ML) INJECTION ONCE
Refills: 0 | Status: COMPLETED | OUTPATIENT
Start: 2025-06-08 | End: 2025-06-08

## 2025-06-08 RX ADMIN — Medication 20 MILLIEQUIVALENT(S): at 12:02

## 2025-06-08 RX ADMIN — Medication 3 MILLILITER(S): at 20:17

## 2025-06-08 RX ADMIN — Medication 100 GRAM(S): at 12:01

## 2025-06-08 RX ADMIN — OXYCODONE HYDROCHLORIDE 10 MILLIGRAM(S): 30 TABLET ORAL at 18:04

## 2025-06-08 RX ADMIN — OXYCODONE HYDROCHLORIDE 10 MILLIGRAM(S): 30 TABLET ORAL at 08:36

## 2025-06-08 RX ADMIN — ENOXAPARIN SODIUM 40 MILLIGRAM(S): 100 INJECTION SUBCUTANEOUS at 08:06

## 2025-06-08 RX ADMIN — OXYCODONE HYDROCHLORIDE 10 MILLIGRAM(S): 30 TABLET ORAL at 08:06

## 2025-06-08 RX ADMIN — Medication 4 MILLIGRAM(S): at 11:05

## 2025-06-08 RX ADMIN — Medication 3 MILLILITER(S): at 05:46

## 2025-06-08 RX ADMIN — Medication 3 MILLILITER(S): at 13:49

## 2025-06-08 RX ADMIN — OXYCODONE HYDROCHLORIDE 10 MILLIGRAM(S): 30 TABLET ORAL at 18:34

## 2025-06-08 RX ADMIN — Medication 4 MILLIGRAM(S): at 18:03

## 2025-06-08 NOTE — PROGRESS NOTE ADULT - ATTENDING COMMENTS
Patient seen and examined at bedside this morning  She is doing well, tolerating a diet, denies N/V, having ostomy function, pain is controlled  Abdomen soft, ND, AT, ostomy pink patent, bilious output  A/P: Regular diet, strict I's and O's, stoma education, TOV. OOB with ambulation, continue close supportive care during recovery. Plan for discharge in 24-48hrs    Vital Signs Last 24 Hrs  T(C): 36.9 (07 Jun 2025 08:15), Max: 37 (06 Jun 2025 21:58)  T(F): 98.4 (07 Jun 2025 08:15), Max: 98.6 (06 Jun 2025 21:58)  HR: 83 (07 Jun 2025 08:15) (76 - 87)  BP: 128/64 (07 Jun 2025 08:15) (119/47 - 134/50)  BP(mean): --  RR: 18 (07 Jun 2025 08:15) (18 - 18)  SpO2: 95% (07 Jun 2025 08:15) (95% - 96%)    Parameters below as of 07 Jun 2025 08:15  Patient On (Oxygen Delivery Method): room air                          10.9   13.52 )-----------( 304      ( 07 Jun 2025 06:39 )             33.0
Patient seen and examined at bedside this morning  She is doing well, tolerating a diet, denies N/V, having ostomy function, pain is controlled  Abdomen soft, ND, AT, ostomy pink patent, thicker bilious output than yesterday  A/P: Regular diet, strict I's and O's, stoma education, TOV. OOB with ambulation, continue close supportive care during recovery. Plan for discharge in 24hrs    Vital Signs Last 24 Hrs  T(C): 37 (08 Jun 2025 08:17), Max: 37.2 (07 Jun 2025 15:52)  T(F): 98.6 (08 Jun 2025 08:17), Max: 98.9 (07 Jun 2025 15:52)  HR: 88 (08 Jun 2025 08:17) (87 - 89)  BP: 130/61 (08 Jun 2025 08:17) (119/55 - 130/61)  BP(mean): --  RR: 18 (08 Jun 2025 08:17) (18 - 18)  SpO2: 98% (08 Jun 2025 08:17) (93% - 98%)    Parameters below as of 08 Jun 2025 08:17  Patient On (Oxygen Delivery Method): room air                          11.4   12.82 )-----------( 297      ( 08 Jun 2025 06:28 )             34.5
Patient seen and examined at bedside this morning  She is doing well, tolerating a diet, denies N/V, having ostomy function, pain is controlled  Abdomen soft, ND, AT, ostomy pink patent, bilious output  A/P: Regular diet, strict I's and O's, stoma education, maintain Yancey 2/2 low pelvic surgery. OOB with ambulation, continue close supportive care during recovery    Vital Signs Last 24 Hrs  T(C): 36.7 (06 Jun 2025 09:14), Max: 36.7 (06 Jun 2025 03:48)  T(F): 98 (06 Jun 2025 09:14), Max: 98.1 (06 Jun 2025 03:48)  HR: 74 (06 Jun 2025 09:14) (74 - 90)  BP: 121/52 (06 Jun 2025 09:14) (112/58 - 136/67)  BP(mean): --  RR: 19 (06 Jun 2025 09:14) (15 - 19)  SpO2: 95% (06 Jun 2025 09:14) (95% - 100%)    Parameters below as of 06 Jun 2025 09:14  Patient On (Oxygen Delivery Method): room air                          11.2   16.31 )-----------( 296      ( 06 Jun 2025 06:27 )             34.0

## 2025-06-08 NOTE — PROGRESS NOTE ADULT - SUBJECTIVE AND OBJECTIVE BOX
Pt seen at bedside, resting comfortable  Pt denies pain, nausea, vomiting, fever, chills  Tolerated diet, passing gas, ostomy functional    Vitals:  T(C): 36.9 ( @ 01:06), Max: 37.2 ( @ 15:52)  HR: 87 ( @ 01:06) (83 - 89)  BP: 125/50 ( @ 01:06) (119/52 - 128/64)  RR: 18 ( 01:06) (18 - 18)  SpO2: 93% ( @ 01:06) (93% - 97%)     @ 07:01  -   @ 07:00  --------------------------------------------------------  IN:  Total IN: 0 mL    OUT:    Ileostomy (mL): 800 mL    Indwelling Catheter - Urethral (mL): 4550 mL  Total OUT: 5350 mL    Total NET: -5350 mL       @ 07:01  -   @ 01:09  --------------------------------------------------------  IN:  Total IN: 0 mL    OUT:    Ileostomy (mL): 50 mL    Indwelling Catheter - Urethral (mL): 300 mL    Voided (mL): 950 mL  Total OUT: 1300 mL    Total NET: -1300 mL      Physical Exam:  General: AAOx3, Well developed, NAD  Chest: Normal respiratory effort  Heart: RRR  Abdomen: Soft, NTND, ostomy pink, functional with stool and gas, incision clean and intact  Neuro/Psych: No localized deficits. Normal spech, normal tone  Skin: Normal, no rashes, no lesions noted.   Extremities: Warm, well perfused, no edema, Pulses intact     @ 06:39                    10.9  CBC: 13.52>)-------(<304                     33.0                 140 | 112 | 4    CMP:  ----------------------< 119               4.1 | 25 | 0.57                      Ca:8.7  Phos:2.3  M.7               -|      |-        LFTs:  ------|-|-----             -|      |-      Current Inpatient Medications:  acetaminophen     Tablet .. 975 milliGRAM(s) Oral every 6 hours PRN  BUPivacaine liposome 1.3% Injectable 20 milliLiter(s) Local Injection once  enoxaparin Injectable 40 milliGRAM(s) SubCutaneous every 24 hours  ondansetron Injectable 4 milliGRAM(s) IV Push every 6 hours PRN  oxyCODONE    IR 5 milliGRAM(s) Oral every 4 hours PRN  oxyCODONE    IR 10 milliGRAM(s) Oral every 4 hours PRN  prochlorperazine   Injectable 10 milliGRAM(s) IV Push every 6 hours PRN  sodium chloride 0.9% lock flush 3 milliLiter(s) IV Push every 8 hours

## 2025-06-08 NOTE — PROGRESS NOTE ADULT - ASSESSMENT
65 yo F with early rectal Ca, s/p low LAR and DLI 6/5    Tolerated diet, ostomy functional Yancey removed, TOV passed     Plan:    Regular diet  Ambulate/OOBTC  Appreciate hospitalist comgmt  Monitor ostomy function  Ostomy teaching  Pain control PRN  Antiemetic PRN  Dvt ppx  f/u AM labs  Likely dispo Monday    d/w CRS team and attending

## 2025-06-08 NOTE — PROGRESS NOTE ADULT - SUBJECTIVE AND OBJECTIVE BOX
CC: Colon CA  HPI:  63 yo female with PMH of  Diverticulitis, Asthma  who had a colonoscopy in April and was found to have mass, biopsy was done,  + for invasive adenocarcinoma of the rectum.  Pt  was referred to Dr. Wiley and  now S/P LAR with creation of Ileostomy.  Hospitalist service called for medical  management     INTERVAL HPI/ OVERNIGHT EVENTS: Pt  was seen and examined, reports no complains, states feels overall well. Pain controlled. Yancey was removed, voiding. Tolerating diet. Plan discussed     Vital Signs Last 24 Hrs  T(C): 37 (08 Jun 2025 08:17), Max: 37.2 (07 Jun 2025 15:52)  T(F): 98.6 (08 Jun 2025 08:17), Max: 98.9 (07 Jun 2025 15:52)  HR: 88 (08 Jun 2025 08:17) (87 - 89)  BP: 130/61 (08 Jun 2025 08:17) (119/55 - 130/61)  RR: 18 (08 Jun 2025 08:17) (18 - 18)  SpO2: 98% (08 Jun 2025 08:17) (93% - 98%)    Parameters below as of 08 Jun 2025 08:17  Patient On (Oxygen Delivery Method): room air        REVIEW OF SYSTEMS:  All other review of systems is negative unless indicated above.        PHYSICAL EXAM:  General: in no acute distress  Eyes: EOMI; conjunctiva and sclera clear  Head: Normocephalic; atraumatic  ENMT: No nasal discharge; airway clear  Respiratory: Decreased BS at bases No wheezes  Cardiovascular: Regular rate and rhythm. S1 and S2 Normal;   Gastrointestinal: Soft non-tender non-distended; + bowel sounds, RLQ ostomy in place, contains dark  brownish fluid with small pieces   Genitourinary: No  suprapubic  tenderness,   Extremities: No edema  Neurological: Alert and oriented x4, non focal  Musculoskeletal: Normal muscle tone, without deformities  Psychiatric: Cooperative        LABS:                         11.4   12.82 )-----------( 297      ( 08 Jun 2025 06:28 )             34.5     06-08    138  |  106  |  5[L]  ----------------------------<  114[H]  3.8   |  26  |  0.60    Ca    9.2      08 Jun 2025 06:28  Phos  3.4     06-08  Mg     1.7     06-08                              10.9   13.52 )-----------( 304      ( 07 Jun 2025 06:39 )             33.0     06-07    140  |  112[H]  |  4[L]  ----------------------------<  119[H]  4.1   |  25  |  0.57    Ca    8.7      07 Jun 2025 06:39  Phos  2.3     06-07  Mg     1.7     06-07                         11.2   16.31 )-----------( 296      ( 06 Jun 2025 06:27 )             34.0     06 Jun 2025 06:27    141    |  113    |  7      ----------------------------<  124    3.9     |  21     |  0.72     Ca    8.7        06 Jun 2025 06:27  Phos  1.8       06 Jun 2025 06:27  Mg     1.9       06 Jun 2025 06:27      POCT Blood Glucose.: 159 mg/dL (05 Jun 2025 14:33)    Urinalysis Basic - ( 06 Jun 2025 06:27 )  Color: x / Appearance: x / SG: x / pH: x  Gluc: 124 mg/dL / Ketone: x  / Bili: x / Urobili: x   Blood: x / Protein: x / Nitrite: x   Leuk Esterase: x / RBC: x / WBC x   Sq Epi: x / Non Sq Epi: x / Bacteria: x      MEDICATIONS  (STANDING):  BUPivacaine liposome 1.3% Injectable 20 milliLiter(s) Local Injection once  enoxaparin Injectable 40 milliGRAM(s) SubCutaneous every 24 hours  magnesium sulfate  IVPB 1 Gram(s) IV Intermittent once  potassium chloride    Tablet ER 20 milliEquivalent(s) Oral once  sodium chloride 0.9% lock flush 3 milliLiter(s) IV Push every 8 hours    MEDICATIONS  (PRN):  acetaminophen     Tablet .. 975 milliGRAM(s) Oral every 6 hours PRN Mild Pain (1 - 3)  ondansetron Injectable 4 milliGRAM(s) IV Push every 6 hours PRN Nausea and/or Vomiting  oxyCODONE    IR 5 milliGRAM(s) Oral every 4 hours PRN Moderate Pain (4 - 6)  oxyCODONE    IR 10 milliGRAM(s) Oral every 4 hours PRN Severe Pain (7 - 10)  prochlorperazine   Injectable 10 milliGRAM(s) IV Push every 6 hours PRN Nausea/Vomiting        RADIOLOGY & ADDITIONAL TESTS:

## 2025-06-08 NOTE — PROGRESS NOTE ADULT - ASSESSMENT
63 yo female with PMH of  Diverticulitis, Asthma admitted for:     1. Rectal  Invasive Adenocarcinoma. S/p Laparoscopic assisted low anterior resection Creatio of  ileostomy. POD #3   Management as per Sx  team   Control pain On Oxy and Tylenol, Ketorolac   Control Nausea on prochlorperazine   off  IVF's  Yancey removed voiding   Monitor I& O  Monitor Cbc, BMP, monitor and replace lytes   completed Post procedure  Abxs  F/u Pathology       # Asthma, mod persistent, controlled  Monitor pulse ox, supplement O2 PRN   cont albuterol inhaler, Singular and  Symbicort  gets allergy shots every 2 weeks, Dupixent injections    # Hypomagnesemia and Hypophosphatemia  phosph this am WNL  will give IV mag today      # Insomnia  Ambien  PRN     #VTE prophylaxis  lovenox  Venodynes  Ambulate       Thank you for consult, will follow   DC planning as per Sx team

## 2025-06-09 ENCOUNTER — TRANSCRIPTION ENCOUNTER (OUTPATIENT)
Age: 65
End: 2025-06-09

## 2025-06-09 VITALS — WEIGHT: 173.94 LBS

## 2025-06-09 LAB
ANION GAP SERPL CALC-SCNC: 6 MMOL/L — SIGNIFICANT CHANGE UP (ref 5–17)
BUN SERPL-MCNC: 7 MG/DL — SIGNIFICANT CHANGE UP (ref 7–23)
CALCIUM SERPL-MCNC: 9.4 MG/DL — SIGNIFICANT CHANGE UP (ref 8.5–10.1)
CHLORIDE SERPL-SCNC: 103 MMOL/L — SIGNIFICANT CHANGE UP (ref 96–108)
CO2 SERPL-SCNC: 27 MMOL/L — SIGNIFICANT CHANGE UP (ref 22–31)
CREAT SERPL-MCNC: 0.52 MG/DL — SIGNIFICANT CHANGE UP (ref 0.5–1.3)
EGFR: 104 ML/MIN/1.73M2 — SIGNIFICANT CHANGE UP
EGFR: 104 ML/MIN/1.73M2 — SIGNIFICANT CHANGE UP
GLUCOSE SERPL-MCNC: 121 MG/DL — HIGH (ref 70–99)
HCT VFR BLD CALC: 35.6 % — SIGNIFICANT CHANGE UP (ref 34.5–45)
HGB BLD-MCNC: 12.1 G/DL — SIGNIFICANT CHANGE UP (ref 11.5–15.5)
MAGNESIUM SERPL-MCNC: 2 MG/DL — SIGNIFICANT CHANGE UP (ref 1.6–2.6)
MCHC RBC-ENTMCNC: 29.8 PG — SIGNIFICANT CHANGE UP (ref 27–34)
MCHC RBC-ENTMCNC: 34 G/DL — SIGNIFICANT CHANGE UP (ref 32–36)
MCV RBC AUTO: 87.7 FL — SIGNIFICANT CHANGE UP (ref 80–100)
NRBC # BLD AUTO: 0 K/UL — SIGNIFICANT CHANGE UP (ref 0–0)
NRBC # FLD: 0 K/UL — SIGNIFICANT CHANGE UP (ref 0–0)
NRBC BLD AUTO-RTO: 0 /100 WBCS — SIGNIFICANT CHANGE UP (ref 0–0)
PHOSPHATE SERPL-MCNC: 3.5 MG/DL — SIGNIFICANT CHANGE UP (ref 2.5–4.5)
PLATELET # BLD AUTO: 348 K/UL — SIGNIFICANT CHANGE UP (ref 150–400)
PMV BLD: 8.8 FL — SIGNIFICANT CHANGE UP (ref 7–13)
POTASSIUM SERPL-MCNC: 3.8 MMOL/L — SIGNIFICANT CHANGE UP (ref 3.5–5.3)
POTASSIUM SERPL-SCNC: 3.8 MMOL/L — SIGNIFICANT CHANGE UP (ref 3.5–5.3)
RBC # BLD: 4.06 M/UL — SIGNIFICANT CHANGE UP (ref 3.8–5.2)
RBC # FLD: 12.9 % — SIGNIFICANT CHANGE UP (ref 10.3–14.5)
SODIUM SERPL-SCNC: 136 MMOL/L — SIGNIFICANT CHANGE UP (ref 135–145)
WBC # BLD: 11.76 K/UL — HIGH (ref 3.8–10.5)
WBC # FLD AUTO: 11.76 K/UL — HIGH (ref 3.8–10.5)

## 2025-06-09 PROCEDURE — 99221 1ST HOSP IP/OBS SF/LOW 40: CPT

## 2025-06-09 PROCEDURE — 99231 SBSQ HOSP IP/OBS SF/LOW 25: CPT

## 2025-06-09 RX ORDER — ONDANSETRON HCL/PF 4 MG/2 ML
1 VIAL (ML) INJECTION
Qty: 2 | Refills: 0
Start: 2025-06-09 | End: 2025-06-15

## 2025-06-09 RX ORDER — ACETAMINOPHEN 500 MG/5ML
3 LIQUID (ML) ORAL
Qty: 20 | Refills: 0
Start: 2025-06-09

## 2025-06-09 RX ORDER — SOD PHOS DI, MONO/K PHOS MONO 250 MG
1 TABLET ORAL ONCE
Refills: 0 | Status: COMPLETED | OUTPATIENT
Start: 2025-06-09 | End: 2025-06-09

## 2025-06-09 RX ADMIN — Medication 4 MILLIGRAM(S): at 07:49

## 2025-06-09 RX ADMIN — OXYCODONE HYDROCHLORIDE 10 MILLIGRAM(S): 30 TABLET ORAL at 07:49

## 2025-06-09 RX ADMIN — OXYCODONE HYDROCHLORIDE 10 MILLIGRAM(S): 30 TABLET ORAL at 13:50

## 2025-06-09 RX ADMIN — Medication 1 PACKET(S): at 10:35

## 2025-06-09 RX ADMIN — Medication 3 MILLILITER(S): at 04:34

## 2025-06-09 RX ADMIN — OXYCODONE HYDROCHLORIDE 10 MILLIGRAM(S): 30 TABLET ORAL at 13:28

## 2025-06-09 RX ADMIN — OXYCODONE HYDROCHLORIDE 10 MILLIGRAM(S): 30 TABLET ORAL at 08:25

## 2025-06-09 NOTE — ADVANCED PRACTICE NURSE CONSULT - RECOMMEDATIONS
This is a 64 year old female that was admitted to the hospital on 6/5/2025 for rectal cancer and had a laparoscopic lower anterior resection with a loop ileostomy. PMH- diverticulitis and asthma.   In to educate patient on her ileostomy. Patient presents resting in bed. Reports she has reviewed the educational material that was left for her. Patient reports she has been emptying the pouch independently without any issues. Reviewed the function and creation of the osomty. Educated on diet, supplies and bathing. Demonstrated how to change the ostomy appliance with patient. Old wafer and pouch removed along with the midline incision dressing. Midline incision C/D/I. Sotma to RLQ and is red, moist and viable. Liquid green stool noted. Perisotmal skin intact. 2 ¼” wafer cut to fit stoma. New wafer and pouch placed. Patient receptive and watched the entire change.  Discharge kit given to patient to bring home for discharge with supplies. [place] [facility] start referral made by .    Vital Signs Last 24 Hrs  T(C): 36.9 (09 Jun 2025 08:08), Max: 36.9 (09 Jun 2025 08:08)  T(F): 98.4 (09 Jun 2025 08:08), Max: 98.4 (09 Jun 2025 08:08)  HR: 84 (09 Jun 2025 08:08) (82 - 91)  BP: 126/62 (09 Jun 2025 08:08) (108/60 - 126/62)  RR: 18 (09 Jun 2025 08:08) (18 - 18)  SpO2: 95% (09 Jun 2025 08:08) (94% - 98%)  Parameters below as of 09 Jun 2025 08:08  Patient On (Oxygen Delivery Method): room air  Review of Systems  General:	  Skin/Breast:  Ophthalmologic:	  ENMT:	  Respiratory and Thorax: Denies shortness of breath or difficulty breathing  Cardiovascular:	Denies chest pain  Gastrointestinal:	 Reports abdominal discomfort from surgery  Genitourinary:	  Musculoskeletal:	  Neurological:	  Psychiatric:	  Hematology/Lymphatics:	  Endocrine:	  Physical Exam  Eyes: conjunctiva and sclera clear  ENMT: Moist mucous membranes  Neuro: Alert & Oriented X3  Respiratory: Reparations even and unlabored, no shortness of breath  Cardiovascular:  Gastrointestinal: Soft, tender to touch, softly distended, + stool to pouch, ileostomy to RLQ  Genitourinary: Voiding,   SKIN-no rash, no lesion  CNS- alert, oriented X3, non focal   General:   Skin/Breast:   Ophthalmologic:	  ENMT:	  Respiratory and Thorax: Denies SOB	  Cardiovascular:	denies cp or palpitations     LABS:                       12.1   11.76 )-----------( 348      ( 09 Jun 2025 06:49 )             35.6   06-09  136  |  103  |  7   ----------------------------<  121[H]  3.8   |  27  |  0.52    Ca    9.4      09 Jun 2025 06:49  Phos  3.5     06-09  Mg     2.0     06-09    MEDICATIONS  (STANDING):  BUPivacaine liposome 1.3% Injectable 20 milliLiter(s) Local Injection once  enoxaparin Injectable 40 milliGRAM(s) SubCutaneous every 24 hours  magnesium sulfate  IVPB 1 Gram(s) IV Intermittent once  potassium chloride    Tablet ER 20 milliEquivalent(s) Oral once  sodium chloride 0.9% lock flush 3 milliLiter(s) IV Push every 8 hours  MEDICATIONS  (PRN):  acetaminophen     Tablet .. 975 milliGRAM(s) Oral every 6 hours PRN Mild Pain (1 - 3)  ondansetron Injectable 4 milliGRAM(s) IV Push every 6 hours PRN Nausea and/or Vomiting  oxyCODONE    IR 5 milliGRAM(s) Oral every 4 hours PRN Moderate Pain (4 - 6)  oxyCODONE    IR 10 milliGRAM(s) Oral every 4 hours PRN Severe Pain (7 - 10)  prochlorperazine   Injectable 10 milliGRAM(s) IV Push every 6 hours PRN Nausea/Vomiting  Patient to be discharged today.

## 2025-06-09 NOTE — PROGRESS NOTE ADULT - ASSESSMENT
65 yo female with PMH of  Diverticulitis, Asthma admitted for:     1. Rectal  Invasive Adenocarcinoma. S/p Laparoscopic assisted low anterior resection Creatio of  ileostomy. POD #3   Management as per Sx  team   Control pain On Oxy and Tylenol, Ketorolac   Control Nausea on prochlorperazine   off  IVF's  Yancey removed voiding   Monitor I& O  Monitor Cbc, BMP, monitor and replace lytes   completed Post procedure  Abxs  F/u Pathology       # Asthma, mod persistent, controlled  Monitor pulse ox, supplement O2 PRN   cont albuterol inhaler, Singular and  Symbicort  gets allergy shots every 2 weeks, Dupixent injections    # Hypomagnesemia and Hypophosphatemia  phosph this am WNL  will give IV mag today      # Insomnia  Ambien  PRN     #VTE prophylaxis  lovenox  Venodynes  Ambulate       Thank you for consult, will follow   DC planning as per Sx team    65 yo female with PMH of  Diverticulitis, Asthma admitted for:     1. Rectal  Invasive Adenocarcinoma. S/p Laparoscopic assisted low anterior resection Creatio of  ileostomy. POD #3   Management as per Sx  team   Control pain On Oxy and Tylenol, Ketorolac   Control Nausea on prochlorperazine   off  IVF's  Yancey removed voiding   Monitor I& O  Monitor Cbc, BMP, monitor and replace lytes   completed Post procedure  Abxs  F/u Pathology       # Asthma, mod persistent, controlled  Monitor pulse ox, supplement O2 PRN   cont albuterol inhaler, Singular and  Symbicort  gets allergy shots every 2 weeks, Dupixent injections    # Hypomagnesemia and Hypophosphatemia  replaced  repeat WNL      # Insomnia  Ambien  PRN     #VTE prophylaxis  lovenox  Venodynes  Ambulate       Thank you for consult,  medically stable for dc     DC planning as per Sx team

## 2025-06-09 NOTE — DISCHARGE NOTE NURSING/CASE MANAGEMENT/SOCIAL WORK - NSDCPEFALRISK_GEN_ALL_CORE
For information on Fall & Injury Prevention, visit: [email or web address] OR  [email or web address] OR  https://www.cdc.gov/steadi/patient.html

## 2025-06-09 NOTE — DISCHARGE NOTE NURSING/CASE MANAGEMENT/SOCIAL WORK - PATIENT PORTAL LINK FT
You can access the FollowMyHealth Patient Portal offered by [facility] by registering at the following website: [email or web address]. By joining [facility]’s FollowMyHealth portal, you will also be able to view your health information using other applications (apps) compatible with our system.

## 2025-06-09 NOTE — DIETITIAN INITIAL EVALUATION ADULT - OTHER INFO
65 yo F with PMH of  Diverticulitis, Asthma  who had a colonoscopy in April and was found to have mass, biopsy was done,  + for invasive adenocarcinoma of the rectum.  Pt  was referred to Dr. Wiley and  now S/P LAR with creation of Ileostomy. Admitting diagnosis: rectal cancer. Ileostomy education provided to pt by RD; addressed and answered all questions. Pt receptive; NCM handout left at bedside. Upon completion of Nutrition Screen review and patient interview, RD has determined that a full Initial Nutrition Assessment is not warranted due to: No Nutrition Diagnosis

## 2025-06-09 NOTE — DIETITIAN INITIAL EVALUATION ADULT - PERTINENT MEDS FT
MEDICATIONS  (STANDING):  BUPivacaine liposome 1.3% Injectable 20 milliLiter(s) Local Injection once  enoxaparin Injectable 40 milliGRAM(s) SubCutaneous every 24 hours  potassium phosphate / sodium phosphate Powder (PHOS-NaK) 1 Packet(s) Oral once  sodium chloride 0.9% lock flush 3 milliLiter(s) IV Push every 8 hours    MEDICATIONS  (PRN):  acetaminophen     Tablet .. 975 milliGRAM(s) Oral every 6 hours PRN Mild Pain (1 - 3)  ondansetron Injectable 4 milliGRAM(s) IV Push every 6 hours PRN Nausea and/or Vomiting  oxyCODONE    IR 5 milliGRAM(s) Oral every 4 hours PRN Moderate Pain (4 - 6)  oxyCODONE    IR 10 milliGRAM(s) Oral every 4 hours PRN Severe Pain (7 - 10)  prochlorperazine   Injectable 10 milliGRAM(s) IV Push every 6 hours PRN Nausea/Vomiting  zolpidem 5 milliGRAM(s) Oral at bedtime PRN Insomnia

## 2025-06-09 NOTE — PROGRESS NOTE ADULT - SUBJECTIVE AND OBJECTIVE BOX
Pt seen at bedside, resting comfortable. Pt denies pain, nausea, vomiting, fever, chills  Tolerated diet, passing gas, ostomy functional, ambulating. Changing her bag.      Physical Exam:  General: AAOx3, Well developed, NAD  Chest: Normal respiratory effort  Heart: RRR  Abdomen: Soft, NTND, ostomy pink, functional with stool and gas, incision clean and intact  Neuro/Psych: No localized deficits. Normal speech, normal tone  Skin: Normal, no rashes, no lesions noted.   Extremities: Warm, well perfused, no edema, Pulses intact        Vital Signs Last 24 Hrs  T(C): 36.6 (09 Jun 2025 01:00), Max: 37 (08 Jun 2025 08:17)  T(F): 97.8 (09 Jun 2025 01:00), Max: 98.6 (08 Jun 2025 08:17)  HR: 82 (09 Jun 2025 01:00) (82 - 91)  BP: 124/73 (09 Jun 2025 01:00) (108/60 - 130/61)  BP(mean): --  RR: 18 (09 Jun 2025 01:00) (18 - 18)  SpO2: 94% (09 Jun 2025 01:00) (94% - 98%)    Parameters below as of 09 Jun 2025 01:00  Patient On (Oxygen Delivery Method): room air      Daily     Daily   I&O's Detail    07 Jun 2025 07:01  -  08 Jun 2025 07:00  --------------------------------------------------------  IN:  Total IN: 0 mL    OUT:    Ileostomy (mL): 175 mL    Indwelling Catheter - Urethral (mL): 300 mL    Voided (mL): 950 mL  Total OUT: 1425 mL    Total NET: -1425 mL      08 Jun 2025 07:01  -  09 Jun 2025 04:54  --------------------------------------------------------  IN:  Total IN: 0 mL    OUT:    Ileostomy (mL): 50 mL  Total OUT: 50 mL    Total NET: -50 mL                              11.4   12.82 )-----------( 297      ( 08 Jun 2025 06:28 )             34.5     06-08    138  |  106  |  5[L]  ----------------------------<  114[H]  3.8   |  26  |  0.60    Ca    9.2      08 Jun 2025 06:28  Phos  3.4     06-08  Mg     1.7     06-08        Urinalysis Basic - ( 08 Jun 2025 06:28 )    Color: x / Appearance: x / SG: x / pH: x  Gluc: 114 mg/dL / Ketone: x  / Bili: x / Urobili: x   Blood: x / Protein: x / Nitrite: x   Leuk Esterase: x / RBC: x / WBC x   Sq Epi: x / Non Sq Epi: x / Bacteria: x

## 2025-06-09 NOTE — PROGRESS NOTE ADULT - PROVIDER SPECIALTY LIST ADULT
Colorectal Surgery
Hospitalist

## 2025-06-09 NOTE — PROGRESS NOTE ADULT - ASSESSMENT
63 yo F with early rectal Ca, s/p low LAR and DLI 6/5    Tolerated diet, ostomy functional Yancey removed, TOV passed     Plan:  Regular diet  Ambulate/OOBTC  Appreciate hospitalist comgmt  Monitor ostomy function  Ostomy teaching  Pain control PRN  Antiemetic PRN  Dvt ppx  dispo today    d/w CRS team and attending

## 2025-06-09 NOTE — DIETITIAN INITIAL EVALUATION ADULT - NSFNSGIIOFT_GEN_A_CORE
06-08-25 @ 07:01  -  06-09-25 @ 07:00  --------------------------------------------------------  OUT:    Ileostomy (mL): 400 mL  Total OUT: 400 mL    Total NET: -400 mL

## 2025-06-09 NOTE — DISCHARGE NOTE NURSING/CASE MANAGEMENT/SOCIAL WORK - FINANCIAL ASSISTANCE
[facility] provides services at a reduced cost to those who are determined to be eligible through [facility]’s financial assistance program. Information regarding [facility]’s financial assistance program can be found by going to [email or web address] or by calling [phone].

## 2025-06-09 NOTE — DIETITIAN INITIAL EVALUATION ADULT - REASON INDICATOR FOR ASSESSMENT
Full Nutrition Initial Assessment not warranted at this time due to:  [X] Pt seen for LOS; [ ] Pt seen for MST consult ("Unsure" of weight loss)  [X] No nutrition risk or diagnosis identified   [ ] Current medical condition precludes nutrition intervention at this time  [ ] Hospice Care and/or Comfort Measures

## 2025-06-09 NOTE — DIETITIAN INITIAL EVALUATION ADULT - ADD RECOMMEND
1. C/w current diet to optimize nutritional status  2. MVI w/ minerals daily to ensure 100% RDA met   3. Monitor bowel movements, if no BM for >3 days, consider implementing bowel regimen.  Limited nutrition interventions warranted. RD will follow-up per protocol or PRN. Reconsult RD if GOC/ status changes.

## 2025-06-09 NOTE — DIETITIAN INITIAL EVALUATION ADULT - PERTINENT LABORATORY DATA
06-09    136  |  103  |  7   ----------------------------<  121[H]  3.8   |  27  |  0.52    Ca    9.4      09 Jun 2025 06:49  Phos  3.5     06-09  Mg     2.0     06-09    A1C with Estimated Average Glucose Result: 5.6 % (05-20-25 @ 13:40)

## 2025-06-09 NOTE — PROGRESS NOTE ADULT - SUBJECTIVE AND OBJECTIVE BOX
CC: Colon CA  HPI:  63 yo female with PMH of  Diverticulitis, Asthma  who had a colonoscopy in April and was found to have mass, biopsy was done,  + for invasive adenocarcinoma of the rectum.  Pt  was referred to Dr. Wiley and  now S/P LAR with creation of Ileostomy.  Hospitalist service called for medical  management     INTERVAL HPI/ OVERNIGHT EVENTS: Pt  was seen and examined,         Vital Signs Last 24 Hrs  T(C): 36.9 (09 Jun 2025 08:08), Max: 36.9 (09 Jun 2025 08:08)  T(F): 98.4 (09 Jun 2025 08:08), Max: 98.4 (09 Jun 2025 08:08)  HR: 84 (09 Jun 2025 08:08) (82 - 91)  BP: 126/62 (09 Jun 2025 08:08) (108/60 - 126/62)  RR: 18 (09 Jun 2025 08:08) (18 - 18)  SpO2: 95% (09 Jun 2025 08:08) (94% - 98%)    Parameters below as of 09 Jun 2025 08:08  Patient On (Oxygen Delivery Method): room air          REVIEW OF SYSTEMS:  All other review of systems is negative unless indicated above.        PHYSICAL EXAM:  General: in no acute distress  Eyes: EOMI; conjunctiva and sclera clear  Head: Normocephalic; atraumatic  ENMT: No nasal discharge; airway clear  Respiratory: Decreased BS at bases No wheezes  Cardiovascular: Regular rate and rhythm. S1 and S2 Normal;   Gastrointestinal: Soft non-tender non-distended; + bowel sounds, RLQ ostomy in place, contains dark  brownish fluid with small pieces   Genitourinary: No  suprapubic  tenderness,   Extremities: No edema  Neurological: Alert and oriented x4, non focal  Musculoskeletal: Normal muscle tone, without deformities  Psychiatric: Cooperative        LABS:                         12.1   11.76 )-----------( 348      ( 09 Jun 2025 06:49 )             35.6     06-09    136  |  103  |  7   ----------------------------<  121[H]  3.8   |  27  |  0.52    Ca    9.4      09 Jun 2025 06:49  Phos  3.5     06-09  Mg     2.0     06-09      Urinalysis Basic - ( 09 Jun 2025 06:49 )  Color: x / Appearance: x / SG: x / pH: x  Gluc: 121 mg/dL / Ketone: x  / Bili: x / Urobili: x   Blood: x / Protein: x / Nitrite: x   Leuk Esterase: x / RBC: x / WBC x   Sq Epi: x / Non Sq Epi: x / Bacteria: x                              11.4   12.82 )-----------( 297      ( 08 Jun 2025 06:28 )             34.5     06-08    138  |  106  |  5[L]  ----------------------------<  114[H]  3.8   |  26  |  0.60    Ca    9.2      08 Jun 2025 06:28  Phos  3.4     06-08  Mg     1.7     06-08                              10.9   13.52 )-----------( 304      ( 07 Jun 2025 06:39 )             33.0     06-07    140  |  112[H]  |  4[L]  ----------------------------<  119[H]  4.1   |  25  |  0.57    Ca    8.7      07 Jun 2025 06:39  Phos  2.3     06-07  Mg     1.7     06-07                         11.2   16.31 )-----------( 296      ( 06 Jun 2025 06:27 )             34.0     06 Jun 2025 06:27    141    |  113    |  7      ----------------------------<  124    3.9     |  21     |  0.72     Ca    8.7        06 Jun 2025 06:27  Phos  1.8       06 Jun 2025 06:27  Mg     1.9       06 Jun 2025 06:27      POCT Blood Glucose.: 159 mg/dL (05 Jun 2025 14:33)    Urinalysis Basic - ( 06 Jun 2025 06:27 )  Color: x / Appearance: x / SG: x / pH: x  Gluc: 124 mg/dL / Ketone: x  / Bili: x / Urobili: x   Blood: x / Protein: x / Nitrite: x   Leuk Esterase: x / RBC: x / WBC x   Sq Epi: x / Non Sq Epi: x / Bacteria: x      MEDICATIONS  (STANDING):  BUPivacaine liposome 1.3% Injectable 20 milliLiter(s) Local Injection once  enoxaparin Injectable 40 milliGRAM(s) SubCutaneous every 24 hours  magnesium sulfate  IVPB 1 Gram(s) IV Intermittent once  potassium chloride    Tablet ER 20 milliEquivalent(s) Oral once  sodium chloride 0.9% lock flush 3 milliLiter(s) IV Push every 8 hours    MEDICATIONS  (PRN):  acetaminophen     Tablet .. 975 milliGRAM(s) Oral every 6 hours PRN Mild Pain (1 - 3)  ondansetron Injectable 4 milliGRAM(s) IV Push every 6 hours PRN Nausea and/or Vomiting  oxyCODONE    IR 5 milliGRAM(s) Oral every 4 hours PRN Moderate Pain (4 - 6)  oxyCODONE    IR 10 milliGRAM(s) Oral every 4 hours PRN Severe Pain (7 - 10)  prochlorperazine   Injectable 10 milliGRAM(s) IV Push every 6 hours PRN Nausea/Vomiting        RADIOLOGY & ADDITIONAL TESTS: Your opinion matters!  Thank you for choosing [facility].  You might receive a survey in the mail about your experience today to evaluate our clinic.  Please fill it out and return it in the pre-paid envelope.  It was a pleasure to care for you today!             CC: Colon CA  HPI:  63 yo female with PMH of  Diverticulitis, Asthma  who had a colonoscopy in April and was found to have mass, biopsy was done,  + for invasive adenocarcinoma of the rectum.  Pt  was referred to Dr. Wiley and  now S/P LAR with creation of Ileostomy.  Hospitalist service called for medical  management     INTERVAL HPI/ OVERNIGHT EVENTS: Pt  was seen and examined, feels well, no complains. Results discussed     Vital Signs Last 24 Hrs  T(C): 36.9 (09 Jun 2025 08:08), Max: 36.9 (09 Jun 2025 08:08)  T(F): 98.4 (09 Jun 2025 08:08), Max: 98.4 (09 Jun 2025 08:08)  HR: 84 (09 Jun 2025 08:08) (82 - 91)  BP: 126/62 (09 Jun 2025 08:08) (108/60 - 126/62)  RR: 18 (09 Jun 2025 08:08) (18 - 18)  SpO2: 95% (09 Jun 2025 08:08) (94% - 98%)    Parameters below as of 09 Jun 2025 08:08  Patient On (Oxygen Delivery Method): room air        REVIEW OF SYSTEMS:  All other review of systems is negative unless indicated above.        PHYSICAL EXAM:  General: in no acute distress  Eyes: EOMI; conjunctiva and sclera clear  Head: Normocephalic; atraumatic  ENMT: No nasal discharge; airway clear  Respiratory: Decreased BS at bases No wheezes  Cardiovascular: Regular rate and rhythm. S1 and S2 Normal;   Gastrointestinal: Soft non-tender non-distended; + bowel sounds, RLQ ostomy in place, contains dark  brownish fluid with small pieces   Genitourinary: No  suprapubic  tenderness,   Extremities: No edema  Neurological: Alert and oriented x4, non focal  Musculoskeletal: Normal muscle tone, without deformities  Psychiatric: Cooperative        LABS:                         12.1   11.76 )-----------( 348      ( 09 Jun 2025 06:49 )             35.6     06-09    136  |  103  |  7   ----------------------------<  121[H]  3.8   |  27  |  0.52    Ca    9.4      09 Jun 2025 06:49  Phos  3.5     06-09  Mg     2.0     06-09      Urinalysis Basic - ( 09 Jun 2025 06:49 )  Color: x / Appearance: x / SG: x / pH: x  Gluc: 121 mg/dL / Ketone: x  / Bili: x / Urobili: x   Blood: x / Protein: x / Nitrite: x   Leuk Esterase: x / RBC: x / WBC x   Sq Epi: x / Non Sq Epi: x / Bacteria: x                              11.4   12.82 )-----------( 297      ( 08 Jun 2025 06:28 )             34.5     06-08    138  |  106  |  5[L]  ----------------------------<  114[H]  3.8   |  26  |  0.60    Ca    9.2      08 Jun 2025 06:28  Phos  3.4     06-08  Mg     1.7     06-08                              10.9   13.52 )-----------( 304      ( 07 Jun 2025 06:39 )             33.0     06-07    140  |  112[H]  |  4[L]  ----------------------------<  119[H]  4.1   |  25  |  0.57    Ca    8.7      07 Jun 2025 06:39  Phos  2.3     06-07  Mg     1.7     06-07                         11.2   16.31 )-----------( 296      ( 06 Jun 2025 06:27 )             34.0     06 Jun 2025 06:27    141    |  113    |  7      ----------------------------<  124    3.9     |  21     |  0.72     Ca    8.7        06 Jun 2025 06:27  Phos  1.8       06 Jun 2025 06:27  Mg     1.9       06 Jun 2025 06:27      POCT Blood Glucose.: 159 mg/dL (05 Jun 2025 14:33)    Urinalysis Basic - ( 06 Jun 2025 06:27 )  Color: x / Appearance: x / SG: x / pH: x  Gluc: 124 mg/dL / Ketone: x  / Bili: x / Urobili: x   Blood: x / Protein: x / Nitrite: x   Leuk Esterase: x / RBC: x / WBC x   Sq Epi: x / Non Sq Epi: x / Bacteria: x      MEDICATIONS  (STANDING):  BUPivacaine liposome 1.3% Injectable 20 milliLiter(s) Local Injection once  enoxaparin Injectable 40 milliGRAM(s) SubCutaneous every 24 hours  magnesium sulfate  IVPB 1 Gram(s) IV Intermittent once  potassium chloride    Tablet ER 20 milliEquivalent(s) Oral once  sodium chloride 0.9% lock flush 3 milliLiter(s) IV Push every 8 hours    MEDICATIONS  (PRN):  acetaminophen     Tablet .. 975 milliGRAM(s) Oral every 6 hours PRN Mild Pain (1 - 3)  ondansetron Injectable 4 milliGRAM(s) IV Push every 6 hours PRN Nausea and/or Vomiting  oxyCODONE    IR 5 milliGRAM(s) Oral every 4 hours PRN Moderate Pain (4 - 6)  oxyCODONE    IR 10 milliGRAM(s) Oral every 4 hours PRN Severe Pain (7 - 10)  prochlorperazine   Injectable 10 milliGRAM(s) IV Push every 6 hours PRN Nausea/Vomiting        RADIOLOGY & ADDITIONAL TESTS:

## 2025-06-10 PROBLEM — J45.909 UNSPECIFIED ASTHMA, UNCOMPLICATED: Chronic | Status: ACTIVE | Noted: 2025-05-20

## 2025-06-10 PROBLEM — Z87.19 PERSONAL HISTORY OF OTHER DISEASES OF THE DIGESTIVE SYSTEM: Chronic | Status: ACTIVE | Noted: 2025-05-20

## 2025-06-10 PROBLEM — C20 MALIGNANT NEOPLASM OF RECTUM: Chronic | Status: ACTIVE | Noted: 2025-05-20

## 2025-06-12 LAB — SURGICAL PATHOLOGY STUDY: SIGNIFICANT CHANGE UP

## 2025-06-16 DIAGNOSIS — K66.0 PERITONEAL ADHESIONS (POSTPROCEDURAL) (POSTINFECTION): ICD-10-CM

## 2025-06-16 DIAGNOSIS — C20 MALIGNANT NEOPLASM OF RECTUM: ICD-10-CM

## 2025-06-16 DIAGNOSIS — E66.01 MORBID (SEVERE) OBESITY DUE TO EXCESS CALORIES: ICD-10-CM

## 2025-06-19 ENCOUNTER — NON-APPOINTMENT (OUTPATIENT)
Age: 65
End: 2025-06-19

## 2025-06-19 LAB
ALBUMIN SERPL ELPH-MCNC: 3.8 G/DL
ALP BLD-CCNC: 69 U/L
ALT SERPL-CCNC: 22 U/L
ANION GAP SERPL CALC-SCNC: 16 MMOL/L
AST SERPL-CCNC: 18 U/L
BILIRUB SERPL-MCNC: 0.4 MG/DL
BUN SERPL-MCNC: 13 MG/DL
CALCIUM SERPL-MCNC: 9.9 MG/DL
CHLORIDE SERPL-SCNC: 100 MMOL/L
CO2 SERPL-SCNC: 22 MMOL/L
CREAT SERPL-MCNC: 0.66 MG/DL
EGFRCR SERPLBLD CKD-EPI 2021: 98 ML/MIN/1.73M2
GLUCOSE SERPL-MCNC: 94 MG/DL
POTASSIUM SERPL-SCNC: 4.3 MMOL/L
PROT SERPL-MCNC: 6.4 G/DL
SODIUM SERPL-SCNC: 138 MMOL/L

## 2025-06-20 ENCOUNTER — APPOINTMENT (OUTPATIENT)
Dept: COLORECTAL SURGERY | Facility: CLINIC | Age: 65
End: 2025-06-20
Payer: COMMERCIAL

## 2025-06-20 PROBLEM — Z09 POSTOP CHECK: Status: ACTIVE | Noted: 2025-06-20

## 2025-06-20 PROCEDURE — 99024 POSTOP FOLLOW-UP VISIT: CPT

## 2025-07-04 PROBLEM — J30.2 OTHER SEASONAL ALLERGIC RHINITIS: Chronic | Status: ACTIVE | Noted: 2025-05-20

## 2025-07-15 ENCOUNTER — APPOINTMENT (OUTPATIENT)
Dept: COLORECTAL SURGERY | Facility: CLINIC | Age: 65
End: 2025-07-15
Payer: COMMERCIAL

## 2025-07-15 VITALS
SYSTOLIC BLOOD PRESSURE: 127 MMHG | WEIGHT: 172 LBS | RESPIRATION RATE: 14 BRPM | HEIGHT: 60 IN | DIASTOLIC BLOOD PRESSURE: 79 MMHG | HEART RATE: 93 BPM | BODY MASS INDEX: 33.77 KG/M2

## 2025-07-15 PROCEDURE — 99024 POSTOP FOLLOW-UP VISIT: CPT

## 2025-07-16 PROBLEM — Z93.2 ILEOSTOMY IN PLACE: Status: ACTIVE | Noted: 2025-06-20

## 2025-08-29 ENCOUNTER — OUTPATIENT (OUTPATIENT)
Dept: OUTPATIENT SERVICES | Facility: HOSPITAL | Age: 65
LOS: 1 days | End: 2025-08-29
Payer: COMMERCIAL

## 2025-08-29 VITALS
DIASTOLIC BLOOD PRESSURE: 65 MMHG | SYSTOLIC BLOOD PRESSURE: 107 MMHG | HEART RATE: 75 BPM | RESPIRATION RATE: 16 BRPM | WEIGHT: 178.57 LBS | OXYGEN SATURATION: 98 % | HEIGHT: 60.63 IN | TEMPERATURE: 98 F

## 2025-08-29 DIAGNOSIS — Z98.890 OTHER SPECIFIED POSTPROCEDURAL STATES: Chronic | ICD-10-CM

## 2025-08-29 DIAGNOSIS — Z98.891 HISTORY OF UTERINE SCAR FROM PREVIOUS SURGERY: Chronic | ICD-10-CM

## 2025-08-29 DIAGNOSIS — Z90.49 ACQUIRED ABSENCE OF OTHER SPECIFIED PARTS OF DIGESTIVE TRACT: Chronic | ICD-10-CM

## 2025-08-29 DIAGNOSIS — Z01.818 ENCOUNTER FOR OTHER PREPROCEDURAL EXAMINATION: ICD-10-CM

## 2025-08-29 PROBLEM — C20 MALIGNANT NEOPLASM OF RECTUM: Chronic | Status: INACTIVE | Noted: 2025-05-20 | Resolved: 2025-08-29

## 2025-08-29 LAB
A1C WITH ESTIMATED AVERAGE GLUCOSE RESULT: 5.6 % — SIGNIFICANT CHANGE UP (ref 4–5.6)
ALBUMIN SERPL ELPH-MCNC: 3.5 G/DL — SIGNIFICANT CHANGE UP (ref 3.3–5)
ALP SERPL-CCNC: 75 U/L — SIGNIFICANT CHANGE UP (ref 40–120)
ALT FLD-CCNC: 25 U/L — SIGNIFICANT CHANGE UP (ref 12–78)
ANION GAP SERPL CALC-SCNC: 4 MMOL/L — LOW (ref 5–17)
APTT BLD: 28.3 SEC — SIGNIFICANT CHANGE UP (ref 26.1–36.8)
AST SERPL-CCNC: 19 U/L — SIGNIFICANT CHANGE UP (ref 15–37)
BASOPHILS # BLD AUTO: 0.09 K/UL — SIGNIFICANT CHANGE UP (ref 0–0.2)
BASOPHILS NFR BLD AUTO: 0.8 % — SIGNIFICANT CHANGE UP (ref 0–2)
BILIRUB DIRECT SERPL-MCNC: 0.1 MG/DL — SIGNIFICANT CHANGE UP (ref 0–0.3)
BILIRUB SERPL-MCNC: 0.4 MG/DL — SIGNIFICANT CHANGE UP (ref 0.2–1.2)
BLD GP AB SCN SERPL QL: SIGNIFICANT CHANGE UP
BUN SERPL-MCNC: 15 MG/DL — SIGNIFICANT CHANGE UP (ref 7–23)
CALCIUM SERPL-MCNC: 9.2 MG/DL — SIGNIFICANT CHANGE UP (ref 8.5–10.1)
CHLORIDE SERPL-SCNC: 109 MMOL/L — HIGH (ref 96–108)
CO2 SERPL-SCNC: 27 MMOL/L — SIGNIFICANT CHANGE UP (ref 22–31)
CREAT SERPL-MCNC: 0.62 MG/DL — SIGNIFICANT CHANGE UP (ref 0.5–1.3)
EGFR: 99 ML/MIN/1.73M2 — SIGNIFICANT CHANGE UP
EGFR: 99 ML/MIN/1.73M2 — SIGNIFICANT CHANGE UP
EOSINOPHIL # BLD AUTO: 0.17 K/UL — SIGNIFICANT CHANGE UP (ref 0–0.5)
EOSINOPHIL NFR BLD AUTO: 1.5 % — SIGNIFICANT CHANGE UP (ref 0–6)
ESTIMATED AVERAGE GLUCOSE: 114 MG/DL — SIGNIFICANT CHANGE UP (ref 68–114)
GLUCOSE SERPL-MCNC: 96 MG/DL — SIGNIFICANT CHANGE UP (ref 70–99)
HCT VFR BLD CALC: 40.3 % — SIGNIFICANT CHANGE UP (ref 34.5–45)
HGB BLD-MCNC: 13 G/DL — SIGNIFICANT CHANGE UP (ref 11.5–15.5)
IMM GRANULOCYTES # BLD AUTO: 0.05 K/UL — SIGNIFICANT CHANGE UP (ref 0–0.07)
IMM GRANULOCYTES NFR BLD AUTO: 0.4 % — SIGNIFICANT CHANGE UP (ref 0–0.9)
INR BLD: 0.97 RATIO — SIGNIFICANT CHANGE UP (ref 0.85–1.16)
LYMPHOCYTES # BLD AUTO: 2.51 K/UL — SIGNIFICANT CHANGE UP (ref 1–3.3)
LYMPHOCYTES NFR BLD AUTO: 22.4 % — SIGNIFICANT CHANGE UP (ref 13–44)
MCHC RBC-ENTMCNC: 27.2 PG — SIGNIFICANT CHANGE UP (ref 27–34)
MCHC RBC-ENTMCNC: 32.3 G/DL — SIGNIFICANT CHANGE UP (ref 32–36)
MCV RBC AUTO: 84.3 FL — SIGNIFICANT CHANGE UP (ref 80–100)
MONOCYTES # BLD AUTO: 0.76 K/UL — SIGNIFICANT CHANGE UP (ref 0–0.9)
MONOCYTES NFR BLD AUTO: 6.8 % — SIGNIFICANT CHANGE UP (ref 2–14)
NEUTROPHILS # BLD AUTO: 7.64 K/UL — HIGH (ref 1.8–7.4)
NEUTROPHILS NFR BLD AUTO: 68.1 % — SIGNIFICANT CHANGE UP (ref 43–77)
NRBC # BLD AUTO: 0 K/UL — SIGNIFICANT CHANGE UP (ref 0–0)
NRBC # FLD: 0 K/UL — SIGNIFICANT CHANGE UP (ref 0–0)
NRBC BLD AUTO-RTO: 0 /100 WBCS — SIGNIFICANT CHANGE UP (ref 0–0)
PLATELET # BLD AUTO: 394 K/UL — SIGNIFICANT CHANGE UP (ref 150–400)
PMV BLD: 9 FL — SIGNIFICANT CHANGE UP (ref 7–13)
POTASSIUM SERPL-MCNC: 4.4 MMOL/L — SIGNIFICANT CHANGE UP (ref 3.5–5.3)
POTASSIUM SERPL-SCNC: 4.4 MMOL/L — SIGNIFICANT CHANGE UP (ref 3.5–5.3)
PROT SERPL-MCNC: 7.4 GM/DL — SIGNIFICANT CHANGE UP (ref 6–8.3)
PROTHROM AB SERPL-ACNC: 11.2 SEC — SIGNIFICANT CHANGE UP (ref 9.9–13.4)
RBC # BLD: 4.78 M/UL — SIGNIFICANT CHANGE UP (ref 3.8–5.2)
RBC # FLD: 12.9 % — SIGNIFICANT CHANGE UP (ref 10.3–14.5)
SODIUM SERPL-SCNC: 140 MMOL/L — SIGNIFICANT CHANGE UP (ref 135–145)
WBC # BLD: 11.22 K/UL — HIGH (ref 3.8–10.5)
WBC # FLD AUTO: 11.22 K/UL — HIGH (ref 3.8–10.5)

## 2025-08-29 PROCEDURE — 85610 PROTHROMBIN TIME: CPT

## 2025-08-29 PROCEDURE — 83036 HEMOGLOBIN GLYCOSYLATED A1C: CPT

## 2025-08-29 PROCEDURE — 36415 COLL VENOUS BLD VENIPUNCTURE: CPT

## 2025-08-29 PROCEDURE — 85730 THROMBOPLASTIN TIME PARTIAL: CPT

## 2025-08-29 PROCEDURE — 80053 COMPREHEN METABOLIC PANEL: CPT

## 2025-08-29 PROCEDURE — 85025 COMPLETE CBC W/AUTO DIFF WBC: CPT

## 2025-08-29 PROCEDURE — 86900 BLOOD TYPING SEROLOGIC ABO: CPT

## 2025-08-29 PROCEDURE — 82248 BILIRUBIN DIRECT: CPT

## 2025-08-29 PROCEDURE — 86901 BLOOD TYPING SEROLOGIC RH(D): CPT

## 2025-08-29 PROCEDURE — 86850 RBC ANTIBODY SCREEN: CPT

## 2025-08-30 DIAGNOSIS — Z01.818 ENCOUNTER FOR OTHER PREPROCEDURAL EXAMINATION: ICD-10-CM

## 2025-09-08 ENCOUNTER — RESULT REVIEW (OUTPATIENT)
Age: 65
End: 2025-09-08

## 2025-09-08 ENCOUNTER — APPOINTMENT (OUTPATIENT)
Dept: CT IMAGING | Facility: CLINIC | Age: 65
End: 2025-09-08
Payer: COMMERCIAL

## 2025-09-08 PROCEDURE — 74177 CT ABD & PELVIS W/CONTRAST: CPT

## 2025-09-11 ENCOUNTER — NON-APPOINTMENT (OUTPATIENT)
Age: 65
End: 2025-09-11